# Patient Record
Sex: MALE | Race: WHITE | NOT HISPANIC OR LATINO | Employment: PART TIME | URBAN - METROPOLITAN AREA
[De-identification: names, ages, dates, MRNs, and addresses within clinical notes are randomized per-mention and may not be internally consistent; named-entity substitution may affect disease eponyms.]

---

## 2021-07-15 ENCOUNTER — HISTORICAL (OUTPATIENT)
Dept: RADIOLOGY | Facility: HOSPITAL | Age: 56
End: 2021-07-15

## 2022-04-11 ENCOUNTER — HISTORICAL (OUTPATIENT)
Dept: ADMINISTRATIVE | Facility: HOSPITAL | Age: 57
End: 2022-04-11

## 2022-04-25 VITALS
SYSTOLIC BLOOD PRESSURE: 127 MMHG | HEIGHT: 67 IN | OXYGEN SATURATION: 96 % | DIASTOLIC BLOOD PRESSURE: 71 MMHG | BODY MASS INDEX: 34.81 KG/M2 | WEIGHT: 221.81 LBS

## 2022-08-02 ENCOUNTER — OFFICE VISIT (OUTPATIENT)
Dept: CARDIOLOGY | Facility: CLINIC | Age: 57
End: 2022-08-02

## 2022-08-02 VITALS
DIASTOLIC BLOOD PRESSURE: 79 MMHG | WEIGHT: 217.81 LBS | RESPIRATION RATE: 18 BRPM | TEMPERATURE: 98 F | BODY MASS INDEX: 34.19 KG/M2 | OXYGEN SATURATION: 99 % | HEART RATE: 84 BPM | SYSTOLIC BLOOD PRESSURE: 123 MMHG | HEIGHT: 67 IN

## 2022-08-02 DIAGNOSIS — I25.10 CORONARY ARTERY DISEASE INVOLVING NATIVE CORONARY ARTERY OF NATIVE HEART WITHOUT ANGINA PECTORIS: Primary | ICD-10-CM

## 2022-08-02 DIAGNOSIS — E78.2 MIXED HYPERLIPIDEMIA: ICD-10-CM

## 2022-08-02 DIAGNOSIS — Z72.0 TOBACCO USE: ICD-10-CM

## 2022-08-02 DIAGNOSIS — I73.9 CLAUDICATION: ICD-10-CM

## 2022-08-02 DIAGNOSIS — R07.89 OTHER CHEST PAIN: ICD-10-CM

## 2022-08-02 DIAGNOSIS — I10 HTN (HYPERTENSION), BENIGN: ICD-10-CM

## 2022-08-02 PROCEDURE — 99214 OFFICE O/P EST MOD 30 MIN: CPT | Mod: PBBFAC | Performed by: NURSE PRACTITIONER

## 2022-08-02 PROCEDURE — 93005 ELECTROCARDIOGRAM TRACING: CPT

## 2022-08-02 PROCEDURE — 99214 OFFICE O/P EST MOD 30 MIN: CPT | Mod: S$PBB,,, | Performed by: NURSE PRACTITIONER

## 2022-08-02 PROCEDURE — 99214 PR OFFICE/OUTPT VISIT, EST, LEVL IV, 30-39 MIN: ICD-10-PCS | Mod: S$PBB,,, | Performed by: NURSE PRACTITIONER

## 2022-08-02 RX ORDER — METOPROLOL TARTRATE 25 MG/1
25 TABLET, FILM COATED ORAL DAILY
COMMUNITY
Start: 2022-04-14 | End: 2022-08-02 | Stop reason: SDUPTHER

## 2022-08-02 RX ORDER — CLOPIDOGREL BISULFATE 75 MG/1
75 TABLET ORAL DAILY
COMMUNITY
Start: 2022-07-11 | End: 2022-08-02 | Stop reason: SDUPTHER

## 2022-08-02 RX ORDER — ATORVASTATIN CALCIUM 80 MG/1
80 TABLET, FILM COATED ORAL NIGHTLY
COMMUNITY
Start: 2022-04-14 | End: 2022-08-02 | Stop reason: SDUPTHER

## 2022-08-02 RX ORDER — BUPROPION HYDROCHLORIDE 100 MG/1
100 TABLET, EXTENDED RELEASE ORAL 2 TIMES DAILY
Status: ON HOLD | COMMUNITY
Start: 2022-04-14 | End: 2023-02-02

## 2022-08-02 RX ORDER — METOPROLOL TARTRATE 25 MG/1
25 TABLET, FILM COATED ORAL DAILY
Qty: 90 TABLET | Refills: 3 | Status: SHIPPED | OUTPATIENT
Start: 2022-08-02 | End: 2023-02-09 | Stop reason: SDUPTHER

## 2022-08-02 RX ORDER — ATORVASTATIN CALCIUM 80 MG/1
80 TABLET, FILM COATED ORAL NIGHTLY
Qty: 90 TABLET | Refills: 3 | Status: SHIPPED | OUTPATIENT
Start: 2022-08-02 | End: 2023-02-09 | Stop reason: SDUPTHER

## 2022-08-02 RX ORDER — IBUPROFEN 200 MG
1 TABLET ORAL DAILY
COMMUNITY
Start: 2022-03-18

## 2022-08-02 RX ORDER — LISINOPRIL 20 MG/1
20 TABLET ORAL DAILY
Qty: 90 TABLET | Refills: 3 | Status: SHIPPED | OUTPATIENT
Start: 2022-08-02 | End: 2023-02-09 | Stop reason: SDUPTHER

## 2022-08-02 RX ORDER — LISINOPRIL 20 MG/1
20 TABLET ORAL DAILY
COMMUNITY
Start: 2022-07-11 | End: 2022-08-02 | Stop reason: SDUPTHER

## 2022-08-02 RX ORDER — CLOPIDOGREL BISULFATE 75 MG/1
75 TABLET ORAL DAILY
Qty: 90 TABLET | Refills: 3 | Status: SHIPPED | OUTPATIENT
Start: 2022-08-02 | End: 2023-01-12

## 2022-08-02 NOTE — PROGRESS NOTES
CHIEF COMPLAINT:   Chief Complaint   Patient presents with    13mt f/u.     Pt was air lifted to [facility] in April with chest pain and was told he did not have an MI.                    Review of patient's allergies indicates:  No Known Allergies                                       HPI:  Paco Culp 56 y.o. male  with CAD/STEMI with stenting PCI/RCA in November 2019, PTCA/RCA (Nov. 2019), PTCA/stenting Diagnal 1 (Sept. 2020) . He is a former patient of [facility], .  Patient was last seen in Cardiology clinic in June 2021 to establish care.  During his last clinic visit,  the patient endorsed midsternal chest pain that he described as a squeezing pain that radiated to bilateral arms and neck.  He was scheduled to undergo a nuclear stress test but was able to complete it.  Today the patient continues to endorse intermittent chest pain that it describes as an exertional midsternal squeezing pain with radiation to arms bilaterally.  Of note,  the patient reports that he was airlifted to [facility] in April with chest pain symptoms but was found to not have an acute MI.  Chest x-ray obtained that time revealed acute bronchitis.  He also continues to endorse ongoing exertional dyspnea that has not progressed since seen.  He denies any palpitations, orthopnea, PND or syncope.  He does continue to endorse intermittent claudication symptoms, citing pain in bilateral legs when ambulating.  Of note, the patient was to complete previously ordered PRASHANTH testing but was unable to complete testing due to loss of insurance.  He reports compliance with his medications.      TTE-09/08/2020  The study quality is average  The left ventricle is normal in size.  Global left ventricular systolic function is borderline normal.  The left ventricular ejection fraction is 50%.    The left ventricle diastolic function is impaired grade 1 with normal left atrial pressure  The left atrial diameter is mildly increased  Mild 1+ mitral  regurgitation  Insufficient TR to estimate RVSP      Left heart catheterization 11/03/2019  Procedure summary  Acute inferior MI  PTCA/stent under proximal RCA 0%    Left heart catheterization 09/23/2020  PTA/stent Diagonal 1( ISR) 80- 90% to 10%.                                                                                                                                                                                                                                                                                                                                                                                                                                                                  Patient Active Problem List   Diagnosis    Coronary artery disease involving native coronary artery of native heart without angina pectoris    HTN (hypertension), benign    Mixed hyperlipidemia    Tobacco use     Past Surgical History:   Procedure Laterality Date    coronary stents        Social History     Socioeconomic History    Marital status: Unknown   Tobacco Use    Smoking status: Current Every Day Smoker     Packs/day: 1.00     Years: 30.00     Pack years: 30.00    Smokeless tobacco: Never Used   Substance and Sexual Activity    Alcohol use: Not Currently    Drug use: Never        Family History   Problem Relation Age of Onset    Heart disease Mother     Hyperlipidemia Mother     Hypertension Mother     Cancer Father          Current Outpatient Medications:     atorvastatin (LIPITOR) 80 MG tablet, Take 80 mg by mouth every evening., Disp: , Rfl:     buPROPion (WELLBUTRIN SR) 100 MG TBSR 12 hr tablet, Take 100 mg by mouth 2 (two) times a day., Disp: , Rfl:     clopidogreL (PLAVIX) 75 mg tablet, Take 75 mg by mouth once daily., Disp: , Rfl:     lisinopriL (PRINIVIL,ZESTRIL) 20 MG tablet, Take 20 mg by mouth once daily., Disp: , Rfl:     metoprolol tartrate (LOPRESSOR) 25 MG tablet, Take 25 mg by mouth once daily., Disp: , Rfl:  "    nicotine (NICODERM CQ) 21 mg/24 hr, Place 1 patch onto the skin once daily., Disp: , Rfl:      ROS:                                                                                                                                                                             Review of Systems   Constitutional: Negative.    HENT: Negative.    Eyes: Negative.    Respiratory: Positive for cough and shortness of breath.    Cardiovascular: Positive for chest pain and claudication.   Gastrointestinal: Negative.    Genitourinary: Negative.    Musculoskeletal: Negative.    Skin: Negative.    Neurological: Negative.    Endo/Heme/Allergies: Negative.    Psychiatric/Behavioral: Negative.         Blood pressure 123/79, pulse 84, temperature 98.2 °F (36.8 °C), resp. rate 18, height 5' 6.93" (1.7 m), weight 98.8 kg (217 lb 13 oz), SpO2 99 %.   PE:  Physical Exam  Constitutional:       Appearance: Normal appearance.   HENT:      Head: Normocephalic.   Eyes:      Pupils: Pupils are equal, round, and reactive to light.   Cardiovascular:      Rate and Rhythm: Normal rate and regular rhythm.      Pulses: Normal pulses.   Pulmonary:      Effort: Pulmonary effort is normal.   Musculoskeletal:         General: Normal range of motion.   Skin:     General: Skin is warm and dry.   Neurological:      General: No focal deficit present.      Mental Status: He is alert and oriented to person, place, and time.   Psychiatric:         Mood and Affect: Mood normal.         Behavior: Behavior normal.                ASSESSMENT/PLAN:  CAD  - Reports intermittent mid sternal chest pain with radiation to bilateral arms. Reports stable STERN  - Recent hospital evaluation with atypical chest pain symptoms, acute MI ruled out  - CAD/STEMI with stenting PCI/RCA in November 2019  - PTCA/RCA (Nov. 2019), PTCA/stenting Diagnal 1 (Sept. 2020)   - Continue Plavix, aspirin, lisinopril, metoprolol tartrate and sublingual nitroglycerin.   - Will order a nuclear " stress test for further evaluation of symptoms.  Has multiple risk factors of HTN, HLD, obesity and tobacco. Reports unable to walk on treadmill due to claudication symptoms  - EKG today       STERN  - Reports ongoing stable exertional dyspnea  - LVEF- 50% per TTE 9.28.20    Hypertension-at goal  - Continue current regimen   - Counseled on low sodium diet    Hyperlipidemia, no recent labs   - Continue atorvastatin 80 mg p.o. daily  - Counseled on low-cholesterol low-fat diet  andencouraged exercise as tolerated  - CMP/FLP within two weeks     Pain in legs  - Reports pain in bilateral legs with ambulation, relieved with rest  - PRASHANTH of BLE for further evaluation     Tobacco use  - Reports smokes a pack of cigarettes a day and is attempting to wean  - Counseled on importance of smoking cessation     EKG  CMP/FLP within two weeks    PRASHANTH of BLE  Nuclear stress test  Referral to Internal Medicine for establishment of PCP  Follow up in cardiology clinic in 2 months or sooner pending results.

## 2022-08-02 NOTE — PATIENT INSTRUCTIONS
EKG  CMP/FLP within two weeks    PRASHANTH of BLE  Nuclear stress test  Referral to Internal Medicine for establishment of PCP  Follow up in cardiology clinic in 2 months or sooner pending results.

## 2022-08-19 ENCOUNTER — HOSPITAL ENCOUNTER (OUTPATIENT)
Dept: RADIOLOGY | Facility: HOSPITAL | Age: 57
Discharge: HOME | End: 2022-08-19
Attending: NURSE PRACTITIONER

## 2022-08-19 ENCOUNTER — HOSPITAL ENCOUNTER (OUTPATIENT)
Dept: CARDIOLOGY | Facility: HOSPITAL | Age: 57
Discharge: HOME | End: 2022-08-19
Attending: NURSE PRACTITIONER

## 2022-08-19 VITALS — SYSTOLIC BLOOD PRESSURE: 124 MMHG | HEART RATE: 83 BPM | DIASTOLIC BLOOD PRESSURE: 89 MMHG | RESPIRATION RATE: 20 BRPM

## 2022-08-19 DIAGNOSIS — I25.10 CORONARY ARTERY DISEASE INVOLVING NATIVE CORONARY ARTERY OF NATIVE HEART WITHOUT ANGINA PECTORIS: ICD-10-CM

## 2022-08-19 DIAGNOSIS — Z72.0 TOBACCO USE: ICD-10-CM

## 2022-08-19 DIAGNOSIS — E78.2 MIXED HYPERLIPIDEMIA: ICD-10-CM

## 2022-08-19 DIAGNOSIS — I10 HTN (HYPERTENSION), BENIGN: ICD-10-CM

## 2022-08-19 DIAGNOSIS — R07.89 OTHER CHEST PAIN: ICD-10-CM

## 2022-08-19 LAB
CV STRESS BASE HR: 75 BPM
DIASTOLIC BLOOD PRESSURE: 89 MMHG
NUC REST EJECTION FRACTION: 45
NUC STRESS EJECTION FRACTION: 48 %
OHS CV CPX 85 PERCENT MAX PREDICTED HEART RATE MALE: 141
OHS CV CPX ESTIMATED METS: 2
OHS CV CPX MAX PREDICTED HEART RATE: 166
OHS CV CPX PATIENT IS FEMALE: 0
OHS CV CPX PATIENT IS MALE: 1
OHS CV CPX PEAK DIASTOLIC BLOOD PRESSURE: 80 MMHG
OHS CV CPX PEAK HEAR RATE: 104 BPM
OHS CV CPX PEAK RATE PRESSURE PRODUCT: NORMAL
OHS CV CPX PEAK SYSTOLIC BLOOD PRESSURE: 133 MMHG
OHS CV CPX PERCENT MAX PREDICTED HEART RATE ACHIEVED: 63
OHS CV CPX RATE PRESSURE PRODUCT PRESENTING: 9300
STRESS ECHO POST EXERCISE DUR MIN: 3 MINUTES
STRESS ECHO POST EXERCISE DUR SEC: 0 SECONDS
SYSTOLIC BLOOD PRESSURE: 124 MMHG

## 2022-08-19 PROCEDURE — 63600175 PHARM REV CODE 636 W HCPCS: Performed by: NURSE PRACTITIONER

## 2022-08-19 PROCEDURE — A9502 TC99M TETROFOSMIN: HCPCS

## 2022-08-19 RX ORDER — REGADENOSON 0.08 MG/ML
0.4 INJECTION, SOLUTION INTRAVENOUS
Status: COMPLETED | OUTPATIENT
Start: 2022-08-19 | End: 2022-08-19

## 2022-08-19 RX ADMIN — REGADENOSON 0.4 MG: 0.08 INJECTION, SOLUTION INTRAVENOUS at 07:08

## 2022-09-27 ENCOUNTER — HOSPITAL ENCOUNTER (OUTPATIENT)
Dept: RADIOLOGY | Facility: HOSPITAL | Age: 57
Discharge: HOME | End: 2022-09-27
Attending: NURSE PRACTITIONER

## 2022-09-27 DIAGNOSIS — I73.9 CLAUDICATION: ICD-10-CM

## 2022-09-27 LAB
LEFT ABI: 1.18
LEFT ARM BP: 131 MMHG
LEFT DORSALIS PEDIS: 157 MMHG
LEFT POSTERIOR TIBIAL: 154 MMHG
LEFT TBI: 0.53
LEFT TOE PRESSURE: 71 MMHG
RIGHT ABI: 1.09
RIGHT ARM BP: 133 MMHG
RIGHT DORSALIS PEDIS: 125 MMHG
RIGHT POSTERIOR TIBIAL: 145 MMHG
RIGHT TBI: 0.66
RIGHT TOE PRESSURE: 88 MMHG

## 2022-09-27 PROCEDURE — 93922 UPR/L XTREMITY ART 2 LEVELS: CPT

## 2022-10-12 ENCOUNTER — OFFICE VISIT (OUTPATIENT)
Dept: CARDIOLOGY | Facility: CLINIC | Age: 57
End: 2022-10-12

## 2022-10-12 VITALS
HEIGHT: 67 IN | HEART RATE: 77 BPM | RESPIRATION RATE: 16 BRPM | WEIGHT: 220.81 LBS | SYSTOLIC BLOOD PRESSURE: 124 MMHG | OXYGEN SATURATION: 94 % | DIASTOLIC BLOOD PRESSURE: 88 MMHG | BODY MASS INDEX: 34.66 KG/M2 | TEMPERATURE: 98 F

## 2022-10-12 DIAGNOSIS — E78.2 MIXED HYPERLIPIDEMIA: ICD-10-CM

## 2022-10-12 DIAGNOSIS — R06.09 DOE (DYSPNEA ON EXERTION): ICD-10-CM

## 2022-10-12 DIAGNOSIS — I25.10 CORONARY ARTERY DISEASE INVOLVING NATIVE CORONARY ARTERY OF NATIVE HEART WITHOUT ANGINA PECTORIS: Primary | ICD-10-CM

## 2022-10-12 DIAGNOSIS — R55 NEAR SYNCOPE: ICD-10-CM

## 2022-10-12 DIAGNOSIS — R07.89 OTHER CHEST PAIN: ICD-10-CM

## 2022-10-12 DIAGNOSIS — Z72.0 TOBACCO USE: ICD-10-CM

## 2022-10-12 DIAGNOSIS — I10 HTN (HYPERTENSION), BENIGN: ICD-10-CM

## 2022-10-12 PROCEDURE — 4010F ACE/ARB THERAPY RXD/TAKEN: CPT | Mod: ,,, | Performed by: NURSE PRACTITIONER

## 2022-10-12 PROCEDURE — 4010F PR ACE/ARB THEARPY RXD/TAKEN: ICD-10-PCS | Mod: ,,, | Performed by: NURSE PRACTITIONER

## 2022-10-12 PROCEDURE — 99214 OFFICE O/P EST MOD 30 MIN: CPT | Mod: S$PBB,,, | Performed by: NURSE PRACTITIONER

## 2022-10-12 PROCEDURE — 99214 PR OFFICE/OUTPT VISIT, EST, LEVL IV, 30-39 MIN: ICD-10-PCS | Mod: S$PBB,,, | Performed by: NURSE PRACTITIONER

## 2022-10-12 PROCEDURE — 99214 OFFICE O/P EST MOD 30 MIN: CPT | Mod: PBBFAC | Performed by: NURSE PRACTITIONER

## 2022-10-12 RX ORDER — ISOSORBIDE MONONITRATE 30 MG/1
30 TABLET, EXTENDED RELEASE ORAL DAILY
Qty: 30 TABLET | Refills: 6 | Status: SHIPPED | OUTPATIENT
Start: 2022-10-12 | End: 2023-01-12 | Stop reason: SDUPTHER

## 2022-10-12 NOTE — PATIENT INSTRUCTIONS
Add Imdur 30 mg qday   Nurse visit in 2-3 weeks for BP and symptoms   Echocardiogram   Carotid US   Referral to Internal Medicine for establishment of PCP- will check on status   Follow up in cardiology clinic in 3 months or sooner pending results.

## 2022-10-12 NOTE — PROGRESS NOTES
"        CHIEF COMPLAINT:   Chief Complaint   Patient presents with    Follow-up     Patient her for 2 mth f/u w stress test results and ABIs. Pt c/o cp "a few days ago" Pt c/o sob while walking. Pt c/o palpitations "sometimes" Pt c/o pain to angie lower legs. Pt denies dizziness and swelling. Questions.    Shortness of Breath    Chest Pain    Palpitations                   Review of patient's allergies indicates:  No Known Allergies                                       HPI:  Paco Culp 57 y.o. male  with CAD/STEMI with stenting PCI/RCA in November 2019, PTCA/RCA (Nov. 2019), PTCA/stenting Diagnal 1 (Sept. 2020) who presents for routine follow up and results of testing. He is a former patient of [facility], .  During his last clinic visit,  the patient endorsed midsternal chest pain that he described as a squeezing pain that radiated to bilateral arms and neck.  He completed a subsequent nuclear stress test that was abnormal revealing a moderate to large-sized, fixed perfusion abnormality consistent with scar in the basal to distal inferoapical walls in the typical distribution of the RCA territory.( 8.19.22)      Today the patient endorses an episode of midsternal chest pain with radiation to bilateral arms.  However, he denies any use of sublingual nitroglycerin.  He also continues to endorse ongoing exertional dyspnea that he states has slightly progressed since last seen.  However, he states his ADLs are unchanged.  He continues to endorse bilateral leg pain.  Of note, the patient completed PRASHANTH testing on 09/27/2022 with no evidence of significant arterial insufficiency.  He denies any orthopnea, palpitations, or PND.  He does endorse occasional episodes of dizziness with near-syncope but denies any actual syncopal episodes.  He reports compliance with his current medications.     Nuclear Stress - Cardiology Interpreted 8.19.22    Interpretation Summary    Abnormal myocardial perfusion scan.    There is a " moderate to large sized, fixed perfusion abnormality consistent with scar in the basal to distal inferoapical wall(s) in the typical distribution of the RCA territory.    There are no other significant perfusion abnormalities.    The gated perfusion images showed an ejection fraction of 45% at rest. The gated perfusion images showed an ejection fraction of 48% post stress.    The EKG portion of this study is negative for ischemia.    The patient reported no chest pain during the stress test.    There were no arrhythmias during stress.    Scar present     TTE-09/08/2020  The study quality is average  The left ventricle is normal in size.  Global left ventricular systolic function is borderline normal.  The left ventricular ejection fraction is 50%.    The left ventricle diastolic function is impaired grade 1 with normal left atrial pressure  The left atrial diameter is mildly increased  Mild 1+ mitral regurgitation  Insufficient TR to estimate RVSP        Left heart catheterization 11/03/2019  Procedure summary  Acute inferior MI  PTCA/stent under proximal RCA 0%     Left heart catheterization 09/23/2020  PTA/stent Diagonal 1( ISR) 80- 90% to 10%.                                                                                                                                                                                            Patient Active Problem List   Diagnosis    Coronary artery disease involving native coronary artery of native heart without angina pectoris    HTN (hypertension), benign    Mixed hyperlipidemia    Tobacco use     Past Surgical History:   Procedure Laterality Date    coronary stents        Social History     Socioeconomic History    Marital status:    Tobacco Use    Smoking status: Every Day     Packs/day: 1.00     Years: 30.00     Pack years: 30.00     Types: Cigarettes    Smokeless tobacco: Never   Substance and Sexual Activity    Alcohol use: Not Currently    Drug use: Never       "  Family History   Problem Relation Age of Onset    Heart disease Mother     Hyperlipidemia Mother     Hypertension Mother     Cancer Father          Current Outpatient Medications:     atorvastatin (LIPITOR) 80 MG tablet, Take 1 tablet (80 mg total) by mouth every evening., Disp: 90 tablet, Rfl: 3    buPROPion (WELLBUTRIN SR) 100 MG TBSR 12 hr tablet, Take 100 mg by mouth 2 (two) times a day., Disp: , Rfl:     clopidogreL (PLAVIX) 75 mg tablet, Take 1 tablet (75 mg total) by mouth once daily., Disp: 90 tablet, Rfl: 3    lisinopriL (PRINIVIL,ZESTRIL) 20 MG tablet, Take 1 tablet (20 mg total) by mouth once daily., Disp: 90 tablet, Rfl: 3    metoprolol tartrate (LOPRESSOR) 25 MG tablet, Take 1 tablet (25 mg total) by mouth once daily., Disp: 90 tablet, Rfl: 3    nicotine (NICODERM CQ) 21 mg/24 hr, Place 1 patch onto the skin once daily., Disp: , Rfl:     isosorbide mononitrate (IMDUR) 30 MG 24 hr tablet, Take 1 tablet (30 mg total) by mouth once daily., Disp: 30 tablet, Rfl: 6     ROS:                                                                                                                                                                             Review of Systems   Constitutional: Negative.    HENT: Negative.     Eyes: Negative.    Respiratory:  Positive for shortness of breath.    Cardiovascular:  Positive for chest pain.   Gastrointestinal: Negative.    Genitourinary: Negative.    Musculoskeletal: Negative.    Skin: Negative.    Neurological: Negative.    Endo/Heme/Allergies: Negative.    Psychiatric/Behavioral: Negative.        Blood pressure 124/88, pulse 77, temperature 97.8 °F (36.6 °C), temperature source Oral, resp. rate 16, height 5' 7" (1.702 m), weight 100.2 kg (220 lb 12.8 oz), SpO2 (!) 94 %.   PE:  Physical Exam  Constitutional:       Appearance: Normal appearance.   HENT:      Head: Normocephalic.   Eyes:      Pupils: Pupils are equal, round, and reactive to light.   Cardiovascular:      Rate " and Rhythm: Normal rate and regular rhythm.      Pulses: Normal pulses.   Pulmonary:      Effort: Pulmonary effort is normal.   Musculoskeletal:         General: Normal range of motion.   Skin:     General: Skin is warm and dry.   Neurological:      General: No focal deficit present.      Mental Status: He is alert and oriented to person, place, and time.   Psychiatric:         Mood and Affect: Mood normal.         Behavior: Behavior normal.              ASSESSMENT/PLAN:  CAD  - Reports intermittent mid sternal chest pain with radiation to bilateral arms. Denied SL nitro use  - Abnormal nuclear stress test-  moderate to large sized, fixed perfusion abnormality consistent with scar in the basal to distal inferoapical wall(s) in the typical distribution of the RCA territory. No reversible ischemia noted (8.19.22)  - CAD/STEMI with stenting PCI/RCA in November 2019  - PTCA/RCA (Nov. 2019), PTCA/stenting Diagnal 1 (Sept. 2020)   - Continue Plavix, aspirin, lisinopril, metoprolol tartrate and sublingual nitroglycerin. Will add Imdur 30 mg qd for continued anginal symptoms  - Nurse visit in 2-3 weeks for BP and symptom check   - Counseled patient on heart healthy, low-cholesterol diet, smoking cessation and activity as tolerated.       STERN  - Reports slight progression and exertional dyspnea  - LVEF- 50% per TTE 9.28.20  - Consider PFTs   - Will repeat Echocardiogram to reassess LV function      Hypertension- Repeat BP at goal  - Continue current regimen. Adding Imdur   - Counseled on low sodium diet     Hyperlipidemia, no recent labs   - Continue atorvastatin 80 mg p.o. daily  - Counseled on low-cholesterol low-fat diet  andencouraged exercise as tolerated  - Patient to complete labs      Pain in legs  - Reports pain in bilateral legs with ambulation, relieved with rest  - PRASHANTH of BLE - no evidence of significant arterial insufficiency (9.27.22)    Dizziness/near syncope  - reports intermittent dizziness and near  syncope  - Carotid US to rule out stenosis, considering risk factors       Tobacco use  - Reports smokes a pack of cigarettes a day and is attempting to wean  - Counseled on importance of smoking cessation. Declined smoking cessation prgram      Add Imdur 30 mg qday   Nurse visit in 2-3 weeks for BP and symptoms   Echocardiogram   Carotid US   Referral to Internal Medicine for establishment of PCP- will check on status   Follow up in cardiology clinic in 3 months or sooner pending results.

## 2022-11-04 ENCOUNTER — HOSPITAL ENCOUNTER (OUTPATIENT)
Dept: RADIOLOGY | Facility: HOSPITAL | Age: 57
Discharge: HOME | End: 2022-11-04
Attending: NURSE PRACTITIONER

## 2022-11-04 DIAGNOSIS — R55 NEAR SYNCOPE: ICD-10-CM

## 2022-11-04 PROCEDURE — 93880 EXTRACRANIAL BILAT STUDY: CPT

## 2022-11-08 LAB
LEFT CBA DIAS: 23 CM/S
LEFT CBA SYS: 69 CM/S
LEFT CCA DIST DIAS: 24 CM/S
LEFT CCA DIST SYS: 75 CM/S
LEFT CCA MID DIAS: 21 CM/S
LEFT CCA MID SYS: 95 CM/S
LEFT CCA PROX DIAS: 24 CM/S
LEFT CCA PROX SYS: 95 CM/S
LEFT ECA DIAS: 18 CM/S
LEFT ECA SYS: 81 CM/S
LEFT ICA DIST DIAS: 17 CM/S
LEFT ICA DIST SYS: 54 CM/S
LEFT ICA MID DIAS: 20 CM/S
LEFT ICA MID SYS: 59 CM/S
LEFT ICA PROX DIAS: 16 CM/S
LEFT ICA PROX SYS: 40 CM/S
LEFT VERTEBRAL DIAS: 13 CM/S
LEFT VERTEBRAL SYS: 33 CM/S
OHS CV CAROTID RIGHT ICA EDV HIGHEST: 25
OHS CV CAROTID ULTRASOUND LEFT ICA/CCA RATIO: 0.79
OHS CV CAROTID ULTRASOUND RIGHT ICA/CCA RATIO: 0.95
OHS CV PV CAROTID LEFT HIGHEST CCA: 95
OHS CV PV CAROTID LEFT HIGHEST ICA: 59
OHS CV PV CAROTID RIGHT HIGHEST CCA: 64
OHS CV PV CAROTID RIGHT HIGHEST ICA: 61
OHS CV US CAROTID LEFT HIGHEST EDV: 20
RIGHT CBA DIAS: 16 CM/S
RIGHT CBA SYS: 55 CM/S
RIGHT CCA DIST DIAS: 14 CM/S
RIGHT CCA DIST SYS: 64 CM/S
RIGHT CCA MID DIAS: 14 CM/S
RIGHT CCA MID SYS: 54 CM/S
RIGHT CCA PROX DIAS: 14 CM/S
RIGHT CCA PROX SYS: 51 CM/S
RIGHT ECA DIAS: 27 CM/S
RIGHT ECA SYS: 94 CM/S
RIGHT ICA DIST DIAS: 22 CM/S
RIGHT ICA DIST SYS: 55 CM/S
RIGHT ICA MID DIAS: 25 CM/S
RIGHT ICA MID SYS: 60 CM/S
RIGHT ICA PROX DIAS: 25 CM/S
RIGHT ICA PROX SYS: 61 CM/S
RIGHT VERTEBRAL DIAS: 19 CM/S
RIGHT VERTEBRAL SYS: 47 CM/S

## 2023-01-12 ENCOUNTER — OFFICE VISIT (OUTPATIENT)
Dept: URGENT CARE | Facility: CLINIC | Age: 58
End: 2023-01-12

## 2023-01-12 ENCOUNTER — HOSPITAL ENCOUNTER (OUTPATIENT)
Dept: RADIOLOGY | Facility: HOSPITAL | Age: 58
Discharge: HOME | End: 2023-01-12
Attending: NURSE PRACTITIONER

## 2023-01-12 ENCOUNTER — OFFICE VISIT (OUTPATIENT)
Dept: CARDIOLOGY | Facility: CLINIC | Age: 58
End: 2023-01-12

## 2023-01-12 VITALS
HEIGHT: 67 IN | TEMPERATURE: 98 F | OXYGEN SATURATION: 94 % | HEART RATE: 88 BPM | RESPIRATION RATE: 18 BRPM | BODY MASS INDEX: 34.06 KG/M2 | WEIGHT: 217 LBS | DIASTOLIC BLOOD PRESSURE: 79 MMHG | SYSTOLIC BLOOD PRESSURE: 124 MMHG

## 2023-01-12 VITALS
RESPIRATION RATE: 18 BRPM | WEIGHT: 217.63 LBS | HEIGHT: 66 IN | BODY MASS INDEX: 34.98 KG/M2 | DIASTOLIC BLOOD PRESSURE: 81 MMHG | HEART RATE: 84 BPM | SYSTOLIC BLOOD PRESSURE: 132 MMHG | OXYGEN SATURATION: 96 % | TEMPERATURE: 99 F

## 2023-01-12 DIAGNOSIS — I25.10 CORONARY ARTERY DISEASE INVOLVING NATIVE CORONARY ARTERY OF NATIVE HEART WITHOUT ANGINA PECTORIS: Primary | ICD-10-CM

## 2023-01-12 DIAGNOSIS — I10 HTN (HYPERTENSION), BENIGN: ICD-10-CM

## 2023-01-12 DIAGNOSIS — R10.32 LEFT LOWER QUADRANT ABDOMINAL PAIN: ICD-10-CM

## 2023-01-12 DIAGNOSIS — E78.2 MIXED HYPERLIPIDEMIA: ICD-10-CM

## 2023-01-12 DIAGNOSIS — K62.5 BRIGHT RED RECTAL BLEEDING: Primary | ICD-10-CM

## 2023-01-12 DIAGNOSIS — Z72.0 TOBACCO USE: ICD-10-CM

## 2023-01-12 DIAGNOSIS — R07.89 OTHER CHEST PAIN: ICD-10-CM

## 2023-01-12 PROBLEM — J32.9 RECURRENT SINUSITIS: Status: ACTIVE | Noted: 2023-01-12

## 2023-01-12 PROBLEM — I20.9 ANGINA PECTORIS: Status: ACTIVE | Noted: 2023-01-12

## 2023-01-12 PROBLEM — Z95.5 H/O HEART ARTERY STENT: Status: ACTIVE | Noted: 2023-01-12

## 2023-01-12 PROBLEM — E78.5 HYPERLIPIDEMIA: Status: ACTIVE | Noted: 2022-08-02

## 2023-01-12 PROBLEM — E66.9 OBESITY: Status: ACTIVE | Noted: 2023-01-12

## 2023-01-12 LAB
ALBUMIN SERPL-MCNC: 3.9 G/DL (ref 3.5–5)
ALBUMIN/GLOB SERPL: 1.2 RATIO (ref 1.1–2)
ALP SERPL-CCNC: 93 UNIT/L (ref 40–150)
ALT SERPL-CCNC: 27 UNIT/L (ref 0–55)
AST SERPL-CCNC: 17 UNIT/L (ref 5–34)
BASOPHILS # BLD AUTO: 0.04 X10(3)/MCL (ref 0–0.2)
BASOPHILS NFR BLD AUTO: 0.4 %
BILIRUBIN DIRECT+TOT PNL SERPL-MCNC: 0.4 MG/DL
BUN SERPL-MCNC: 13 MG/DL (ref 8.4–25.7)
CALCIUM SERPL-MCNC: 9.5 MG/DL (ref 8.4–10.2)
CHLORIDE SERPL-SCNC: 103 MMOL/L (ref 98–107)
CO2 SERPL-SCNC: 29 MMOL/L (ref 22–29)
CREAT SERPL-MCNC: 1.13 MG/DL (ref 0.73–1.18)
EOSINOPHIL # BLD AUTO: 0.22 X10(3)/MCL (ref 0–0.9)
EOSINOPHIL NFR BLD AUTO: 2.1 %
ERYTHROCYTE [DISTWIDTH] IN BLOOD BY AUTOMATED COUNT: 12.8 % (ref 11.5–17)
GFR SERPLBLD CREATININE-BSD FMLA CKD-EPI: >60 MLS/MIN/1.73/M2
GLOBULIN SER-MCNC: 3.3 GM/DL (ref 2.4–3.5)
GLUCOSE SERPL-MCNC: 79 MG/DL (ref 74–100)
HCT VFR BLD AUTO: 51 % (ref 42–52)
HGB BLD-MCNC: 17.1 GM/DL (ref 14–18)
IMM GRANULOCYTES # BLD AUTO: 0.07 X10(3)/MCL (ref 0–0.04)
IMM GRANULOCYTES NFR BLD AUTO: 0.7 %
LYMPHOCYTES # BLD AUTO: 3.34 X10(3)/MCL (ref 0.6–4.6)
LYMPHOCYTES NFR BLD AUTO: 32.3 %
MCH RBC QN AUTO: 30.5 PG
MCHC RBC AUTO-ENTMCNC: 33.5 MG/DL (ref 33–36)
MCV RBC AUTO: 91.1 FL (ref 80–94)
MONOCYTES # BLD AUTO: 0.72 X10(3)/MCL (ref 0.1–1.3)
MONOCYTES NFR BLD AUTO: 7 %
NEUTROPHILS # BLD AUTO: 5.94 X10(3)/MCL (ref 2.1–9.2)
NEUTROPHILS NFR BLD AUTO: 57.5 %
NRBC BLD AUTO-RTO: 0 %
PLATELET # BLD AUTO: 347 X10(3)/MCL (ref 130–400)
PMV BLD AUTO: 9.9 FL (ref 7.4–10.4)
POTASSIUM SERPL-SCNC: 4.6 MMOL/L (ref 3.5–5.1)
PROT SERPL-MCNC: 7.2 GM/DL (ref 6.4–8.3)
RBC # BLD AUTO: 5.6 X10(6)/MCL (ref 4.7–6.1)
SODIUM SERPL-SCNC: 140 MMOL/L (ref 136–145)
WBC # SPEC AUTO: 10.3 X10(3)/MCL (ref 4.5–11.5)

## 2023-01-12 PROCEDURE — 72170 X-RAY EXAM OF PELVIS: CPT | Mod: TC

## 2023-01-12 PROCEDURE — 99215 OFFICE O/P EST HI 40 MIN: CPT | Mod: PBBFAC | Performed by: NURSE PRACTITIONER

## 2023-01-12 PROCEDURE — 99214 OFFICE O/P EST MOD 30 MIN: CPT | Mod: S$PBB,,, | Performed by: NURSE PRACTITIONER

## 2023-01-12 PROCEDURE — 85025 COMPLETE CBC W/AUTO DIFF WBC: CPT | Performed by: NURSE PRACTITIONER

## 2023-01-12 PROCEDURE — 74019 RADEX ABDOMEN 2 VIEWS: CPT | Mod: 26,,, | Performed by: RADIOLOGY

## 2023-01-12 PROCEDURE — 72170 XR PELVIS ROUTINE AP: ICD-10-PCS | Mod: 26,,, | Performed by: RADIOLOGY

## 2023-01-12 PROCEDURE — 99214 PR OFFICE/OUTPT VISIT, EST, LEVL IV, 30-39 MIN: ICD-10-PCS | Mod: S$PBB,,, | Performed by: NURSE PRACTITIONER

## 2023-01-12 PROCEDURE — 36415 COLL VENOUS BLD VENIPUNCTURE: CPT | Performed by: NURSE PRACTITIONER

## 2023-01-12 PROCEDURE — 72170 X-RAY EXAM OF PELVIS: CPT | Mod: 26,,, | Performed by: RADIOLOGY

## 2023-01-12 PROCEDURE — 99215 OFFICE O/P EST HI 40 MIN: CPT | Mod: PBBFAC,27 | Performed by: NURSE PRACTITIONER

## 2023-01-12 PROCEDURE — 74019 RADEX ABDOMEN 2 VIEWS: CPT | Mod: TC

## 2023-01-12 PROCEDURE — 80053 COMPREHEN METABOLIC PANEL: CPT | Performed by: NURSE PRACTITIONER

## 2023-01-12 PROCEDURE — 74019 XR ABDOMEN FLAT AND ERECT: ICD-10-PCS | Mod: 26,,, | Performed by: RADIOLOGY

## 2023-01-12 RX ORDER — ASPIRIN 81 MG/1
81 TABLET ORAL DAILY
Qty: 90 TABLET | Refills: 3 | Status: SHIPPED | OUTPATIENT
Start: 2023-01-12 | End: 2023-02-09 | Stop reason: SDUPTHER

## 2023-01-12 RX ORDER — ALBUTEROL SULFATE 90 UG/1
2 AEROSOL, METERED RESPIRATORY (INHALATION) EVERY 4 HOURS PRN
COMMUNITY
Start: 2022-11-19 | End: 2023-02-09 | Stop reason: SDUPTHER

## 2023-01-12 RX ORDER — ISOSORBIDE MONONITRATE 30 MG/1
30 TABLET, EXTENDED RELEASE ORAL DAILY
Qty: 90 TABLET | Refills: 3 | Status: SHIPPED | OUTPATIENT
Start: 2023-01-12 | End: 2023-02-09 | Stop reason: SDUPTHER

## 2023-01-12 RX ORDER — DOCUSATE CALCIUM 240 MG
240 CAPSULE ORAL DAILY
Qty: 10 CAPSULE | Refills: 0 | Status: SHIPPED | OUTPATIENT
Start: 2023-01-12 | End: 2023-01-22

## 2023-01-12 NOTE — PATIENT INSTRUCTIONS
You have been referred to Internal Medicine Clinic to establish a PCP.  You have been referred to Gastroenterology Clinic for a colonoscopy.    We did lab work and and xray for you today. If there is anything abnormal or anything you need to know, we will call you today or tomorrow.  Results will post to your PORTAL HUE - [facility] over the next 1-5 days. Please check  your hue frequently for results and messages.   Nurse calls from our clinic can take 1-2 weeks even with abnormal results.   If something is urgent, we will call you today.      If you have a large amount of bleeding, you feel weak/short of breath, heart flutters, you feel you may pass out, you start having fevers often - go to the ER.

## 2023-01-12 NOTE — PATIENT INSTRUCTIONS
Complete labs    Discontinue Plavix   Continue ASA daily   Referral to Internal Medicine for establishment of PCP  Follow up in cardiology clinic in 6 months or sooner if needed

## 2023-01-12 NOTE — PROGRESS NOTES
"Subjective:       Patient ID: Paco Culp is a 57 y.o. male.    Vitals:  height is 5' 6" (1.676 m) and weight is 98.7 kg (217 lb 9.6 oz). His oral temperature is 98.6 °F (37 °C). His blood pressure is 132/81 and his pulse is 84. His respiration is 18 and oxygen saturation is 96%.     Chief Complaint: Rectal Bleeding (Patient [place] [facility] [place]/o rectal bleeding and requesting referral to GI. )    HPI hx coronary stents x4 with plavix therapy as recently as 2 weeks ago. Saw cardiology this morning, they dc Plavix. Still on daily ASA81. No PCP due to no insurance. Has been referred to IM Clinic at [facility] but has been unable to obtain an appointment. Sees a walk in clinic in [place]. 1.5 PPD smoker x 30 years. Both parents  of cancers, pt doesn't know what kind. No personal history of cancer. No fevers. 7# weight loss recently. No night sweats or fatigue. Denies hemorrhoids, states has had hemorrhoidectomy in the past.  ROS    Objective:      Physical Exam   Constitutional: He is oriented to person, place, and time.  Non-toxic appearance. He does not appear ill. No distress. obesity  HENT:   Nose: No rhinorrhea or congestion.   Eyes: Conjunctivae are normal.   Pulmonary/Chest: Effort normal and breath sounds normal.   Abdominal: Normal appearance and bowel sounds are normal. He exhibits no mass. protuberant There is abdominal tenderness in the periumbilical area and left lower quadrant. There is no rebound, no guarding and negative Rovsing's sign. No hernia.   Musculoskeletal: Normal range of motion.         General: Normal range of motion.   Neurological: He is alert and oriented to person, place, and time.   Skin: Skin is warm, dry and not diaphoretic.   Psychiatric: His behavior is normal. Mood, judgment and thought content normal.   Vitals reviewed.      Assessment:       1. Bright red rectal bleeding    2. Left lower quadrant abdominal pain        XR ABDOMEN FLAT AND ERECT    Result Date: " 1/12/2023  EXAMINATION: XR ABDOMEN FLAT AND ERECT   CLINICAL HISTORY: BRBPR and abd pain; 7# weight loss;Hemorrhage of anus and rectum   TECHNIQUE: Two views   COMPARISON: None available   FINDINGS: There is moderate colonic fecal loading.  The intestinal gas pattern is nonspecific and nonobstructive. No air fluid levels or pneumoperitoneum identified.     Nonspecific bowel gas pattern.   Electronically signed by: Kaden Silva   Date:    01/12/2023   Time:    12:21    XR PELVIS ROUTINE AP    Result Date: 1/12/2023  EXAMINATION: XR PELVIS ROUTINE AP   CLINICAL HISTORY: BRBPR and abd pain; 7# weight loss;Hemorrhage of anus and rectum   TECHNIQUE: One view   FINDINGS: Bowel gas pattern is nonspecific nonobstructive.  Articular surfaces alignment is preserved and there is no intrinsic osseous abnormality.  No acute fracture, dislocation or significant arthritic changes.  There are 2 radiopacities inferior to the right femoral head which probably are external to the patient.     No acute diagnostic findings on the plain radiograph identified.   Electronically signed by: Kaden Silva   Date:    01/12/2023   Time:    12:23     Plan:         Bright red rectal bleeding  -     XR ABDOMEN FLAT AND ERECT  -     XR PELVIS ROUTINE AP  -     Ambulatory referral/consult to Internal Medicine  -     Ambulatory referral/consult to Gastroenterology  -     CBC Auto Differential  -     Comprehensive Metabolic Panel  -     Protime-INR  -     APTT    Left lower quadrant abdominal pain  -     XR ABDOMEN FLAT AND ERECT  -     XR PELVIS ROUTINE AP  -     Ambulatory referral/consult to Internal Medicine  -     Ambulatory referral/consult to Gastroenterology  -     CBC Auto Differential  -     Comprehensive Metabolic Panel  -     Protime-INR  -     APTT    Other orders  -     docusate calcium (SURFAK) 240 mg capsule; Take 1 capsule (240 mg total) by mouth once daily. To soften stool. for 10 days  Dispense: 10 capsule; Refill: 0                You have been referred to Internal Medicine Clinic to establish a PCP.  You have been referred to Gastroenterology Clinic for a colonoscopy.    We did lab work and and xray for you today. If there is anything abnormal or anything you need to know, we will call you today or tomorrow.  Results will post to your PORTAL HUE - [facility] over the next 1-5 days. Please check  your hue frequently for results and messages.   Nurse calls from our clinic can take 1-2 weeks even with abnormal results.   If something is urgent, we will call you today.      If you have a large amount of bleeding, you feel weak/short of breath, heart flutters, you feel you may pass out, you start having fevers often - go to the ER.

## 2023-01-12 NOTE — PROGRESS NOTES
CHIEF COMPLAINT:   Chief Complaint   Patient presents with    3mt f/u with carotid.     Pt did not do echo due to transportation costs.  He would like for you to review his PRASHANTH with him again, he doesn't recall results from last visit.  He has had NO meds x 2 weeks due to costs.                    Review of patient's allergies indicates:  No Known Allergies                                       HPI:  Paco Culp 57 y.o. male  with CAD/STEMI with stenting PCI/RCA in November 2019, PTCA/stenting Diagnal 1 (Sept. 2020) who presents for routine follow up and results of testing. He is a former patient of [facility], .  The patient completed a nuclear stress test on 8.19.22 due to symptoms of chest pain that was abnormal revealing a moderate to large-sized, fixed perfusion abnormality consistent with scar in the basal to distal inferoapical walls in the typical distribution of the RCA territory.( 8.19.22)  He completed PRASHANTH testing on 9.27.22 that revealed no evidence of significant arterial insufficiency.  Carotid ultrasound obtained on 11.4.22 demonstrated no hemodynamically significant stenosis to bilateral internal carotid arteries.     Today the patient reports that he feels good.  He reports resolution of his chest pain symptoms with the addition of Imdur.  He denies any shortness of breath, orthopnea,  PND,  palpitations, lightheadedness or syncope.  He states his exertional dyspnea and dizziness symptoms also significantly improved after he was treated for acute sinus issues.  He states he is able to perform his normal ADLs have care for his grand child without experiencing any ischemic symptoms.  Of note, the patient reports that he has not taking his medication for approximately 2 weeks due to financial issues.  Of note, the patient reports that he has increased his smoking to 1.5 pack of cigarettes a day due to recent increased stress.       Nuclear Stress - Cardiology Interpreted  8.19.22    Interpretation Summary    Abnormal myocardial perfusion scan.    There is a moderate to large sized, fixed perfusion abnormality consistent with scar in the basal to distal inferoapical wall(s) in the typical distribution of the RCA territory.    There are no other significant perfusion abnormalities.    The gated perfusion images showed an ejection fraction of 45% at rest. The gated perfusion images showed an ejection fraction of 48% post stress.    The EKG portion of this study is negative for ischemia.    The patient reported no chest pain during the stress test.    There were no arrhythmias during stress.    Scar present     TTE-09/08/2020  The study quality is average  The left ventricle is normal in size.  Global left ventricular systolic function is borderline normal.  The left ventricular ejection fraction is 50%.    The left ventricle diastolic function is impaired grade 1 with normal left atrial pressure  The left atrial diameter is mildly increased  Mild 1+ mitral regurgitation  Insufficient TR to estimate RVSP        Left heart catheterization 11/03/2019  Procedure summary  Acute inferior MI  PTCA/stent under proximal RCA 0%     Left heart catheterization 09/23/2020  PTA/stent Diagonal 1( ISR) 80- 90% to 10%.                                                                                                                                                                                            Patient Active Problem List   Diagnosis    Coronary artery disease involving native coronary artery of native heart without angina pectoris    HTN (hypertension), benign    Mixed hyperlipidemia    Tobacco use     Past Surgical History:   Procedure Laterality Date    coronary stents        Social History     Socioeconomic History    Marital status:    Tobacco Use    Smoking status: Every Day     Packs/day: 1.00     Years: 30.00     Pack years: 30.00     Types: Cigarettes    Smokeless tobacco: Never    Substance and Sexual Activity    Alcohol use: Not Currently    Drug use: Never        Family History   Problem Relation Age of Onset    Heart disease Mother     Hyperlipidemia Mother     Hypertension Mother     Cancer Father          Current Outpatient Medications:     albuterol (PROVENTIL/VENTOLIN HFA) 90 mcg/actuation inhaler, Inhale 2 puffs into the lungs every 4 (four) hours as needed., Disp: , Rfl:     atorvastatin (LIPITOR) 80 MG tablet, Take 1 tablet (80 mg total) by mouth every evening. (Patient not taking: Reported on 1/12/2023), Disp: 90 tablet, Rfl: 3    buPROPion (WELLBUTRIN SR) 100 MG TBSR 12 hr tablet, Take 100 mg by mouth 2 (two) times a day., Disp: , Rfl:     clopidogreL (PLAVIX) 75 mg tablet, Take 1 tablet (75 mg total) by mouth once daily. (Patient not taking: Reported on 1/12/2023), Disp: 90 tablet, Rfl: 3    isosorbide mononitrate (IMDUR) 30 MG 24 hr tablet, Take 1 tablet (30 mg total) by mouth once daily. (Patient not taking: Reported on 1/12/2023), Disp: 30 tablet, Rfl: 6    lisinopriL (PRINIVIL,ZESTRIL) 20 MG tablet, Take 1 tablet (20 mg total) by mouth once daily. (Patient not taking: Reported on 1/12/2023), Disp: 90 tablet, Rfl: 3    metoprolol tartrate (LOPRESSOR) 25 MG tablet, Take 1 tablet (25 mg total) by mouth once daily. (Patient not taking: Reported on 1/12/2023), Disp: 90 tablet, Rfl: 3    nicotine (NICODERM CQ) 21 mg/24 hr, Place 1 patch onto the skin once daily., Disp: , Rfl:      ROS:                                                                                                                                                                             Review of Systems   Constitutional: Negative.    HENT: Negative.     Eyes: Negative.    Gastrointestinal: Negative.    Genitourinary: Negative.    Musculoskeletal: Negative.    Skin: Negative.    Neurological: Negative.    Endo/Heme/Allergies: Negative.    Psychiatric/Behavioral: Negative.        Blood pressure 124/79, pulse 88,  "temperature 98.1 °F (36.7 °C), resp. rate 18, height 5' 6.93" (1.7 m), weight 98.4 kg (217 lb), SpO2 (!) 94 %.   PE:  Physical Exam  Constitutional:       Appearance: Normal appearance.   HENT:      Head: Normocephalic.   Eyes:      Pupils: Pupils are equal, round, and reactive to light.   Cardiovascular:      Rate and Rhythm: Normal rate and regular rhythm.      Pulses: Normal pulses.   Pulmonary:      Effort: Pulmonary effort is normal.   Musculoskeletal:         General: Normal range of motion.   Skin:     General: Skin is warm and dry.   Neurological:      General: No focal deficit present.      Mental Status: He is alert and oriented to person, place, and time.   Psychiatric:         Mood and Affect: Mood normal.         Behavior: Behavior normal.              ASSESSMENT/PLAN:  CAD  - CAD/STEMI with stenting PCI/RCA in November 2019  - PTCA/RCA (Nov. 2019), PTCA/stenting Diagnal 1 (Sept. 2020)   - LVEF- 50% per TTE 9.28.20  - Denies CP or SOB  - Abnormal nuclear stress test-  moderate to large sized, fixed perfusion abnormality consistent with scar in the basal to distal inferoapical wall(s) in the typical distribution of the RCA territory. No reversible ischemia noted (8.19.22)  - Continue Aspirin, lisinopril, metoprolol tartrate, imdur 30 mg and SL nitro prn CP.  Okay to discontinue Plavix.  Patient has not had any intervention since 2020  - Counseled patient on heart healthy, low-cholesterol diet, smoking cessation and activity as tolerated.       Hypertension- At goal   - Continue current regimen.  Instructed patient to report to financial  department to assess for assistance plan  - Counseled on low sodium diet     Hyperlipidemia, no recent labs   - Continue atorvastatin 80 mg p.o. daily  - Counseled on low-cholesterol low-fat diet  andencouraged exercise as tolerated  - Patient to complete labs      Tobacco use  - Reports smokes a 1.5 packs of cigarettes a day   - Counseled on importance of smoking " cessation. Offered smoking cessation prgram     Complete labs    Discontinue Plavix   Continue ASA daily   Referral to Internal Medicine for establishment of PCP  Follow up in cardiology clinic in 6 months or sooner if needed

## 2023-01-13 ENCOUNTER — TELEPHONE (OUTPATIENT)
Dept: URGENT CARE | Facility: CLINIC | Age: 58
End: 2023-01-13

## 2023-01-14 ENCOUNTER — OFFICE VISIT (OUTPATIENT)
Dept: URGENT CARE | Facility: CLINIC | Age: 58
End: 2023-01-14

## 2023-01-14 VITALS
RESPIRATION RATE: 18 BRPM | OXYGEN SATURATION: 97 % | WEIGHT: 217.63 LBS | BODY MASS INDEX: 34.98 KG/M2 | SYSTOLIC BLOOD PRESSURE: 124 MMHG | TEMPERATURE: 99 F | DIASTOLIC BLOOD PRESSURE: 83 MMHG | HEIGHT: 66 IN | HEART RATE: 81 BPM

## 2023-01-14 DIAGNOSIS — K92.1 HEMATOCHEZIA: Primary | ICD-10-CM

## 2023-01-14 PROCEDURE — 99215 OFFICE O/P EST HI 40 MIN: CPT | Mod: PBBFAC | Performed by: FAMILY MEDICINE

## 2023-01-14 PROCEDURE — 99211 PR OFFICE/OUTPT VISIT, EST, LEVL I: ICD-10-PCS | Mod: S$PBB,,, | Performed by: FAMILY MEDICINE

## 2023-01-14 PROCEDURE — 99211 OFF/OP EST MAY X REQ PHY/QHP: CPT | Mod: S$PBB,,, | Performed by: FAMILY MEDICINE

## 2023-01-14 RX ORDER — BUPROPION HYDROCHLORIDE 150 MG/1
TABLET, EXTENDED RELEASE ORAL
COMMUNITY
Start: 2023-01-12 | End: 2023-02-09 | Stop reason: SDUPTHER

## 2023-01-14 NOTE — PROGRESS NOTES
"Subjective:       Patient ID: Paco Culp is a 57 y.o. male.    Vitals:  height is 5' 6" (1.676 m) and weight is 98.7 kg (217 lb 9.5 oz). His temperature is 98.6 °F (37 °C). His blood pressure is 124/83 and his pulse is 81. His respiration is 18 and oxygen saturation is 97%.     Chief Complaint: Labs Only (Was called to return for lab work)    HPI    Patient evidently was called to return to urgent care to have more blood drawn for PT INR.  Has had episodic hematochezia for 2 years, being set [facility] with [facility] currently.  He is in no acute distress.  Has a history of CAD with stents.  Currently asymptomatic  ROS    Objective:      Physical Exam   Constitutional: He appears well-developed. No distress.   Cardiovascular: Normal rate and regular rhythm.   Pulmonary/Chest: Effort normal and breath sounds normal.   Abdominal: He exhibits no distension.   Musculoskeletal: Normal range of motion.         General: Normal range of motion.   Skin: Skin is warm and dry.   Nursing note and vitals reviewed.      Assessment:       1. Hematochezia            Plan:         Hematochezia         Patient has been followed by local cardiologist and likely has had coags done numerous times in the past.  He needs a colonoscopy.  Keep [facility] appointments when contacted.  Further lab work was not drawn today            "

## 2023-01-20 ENCOUNTER — HOSPITAL ENCOUNTER (EMERGENCY)
Facility: HOSPITAL | Age: 58
Discharge: HOME | End: 2023-01-20
Attending: STUDENT IN AN ORGANIZED HEALTH CARE EDUCATION/TRAINING PROGRAM
Payer: OTHER MISCELLANEOUS

## 2023-01-20 VITALS
OXYGEN SATURATION: 95 % | DIASTOLIC BLOOD PRESSURE: 87 MMHG | WEIGHT: 217 LBS | SYSTOLIC BLOOD PRESSURE: 138 MMHG | TEMPERATURE: 98 F | HEIGHT: 67 IN | BODY MASS INDEX: 34.06 KG/M2 | HEART RATE: 90 BPM | RESPIRATION RATE: 20 BRPM
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
## 2023-01-20 DIAGNOSIS — S20.222A BACK CONTUSION, LEFT, INITIAL ENCOUNTER: ICD-10-CM

## 2023-01-20 PROCEDURE — 99284 EMERGENCY DEPT VISIT MOD MDM: CPT | Mod: 25

## 2023-01-20 PROCEDURE — 25000003 PHARM REV CODE 250: Performed by: STUDENT IN AN ORGANIZED HEALTH CARE EDUCATION/TRAINING PROGRAM

## 2023-01-20 RX ORDER — HYDROCODONE BITARTRATE AND ACETAMINOPHEN 5; 325 MG/1; MG/1
1 TABLET ORAL
Status: COMPLETED | OUTPATIENT
Start: 2023-01-20 | End: 2023-01-20

## 2023-01-20 RX ORDER — HYDROCODONE BITARTRATE AND ACETAMINOPHEN 5; 325 MG/1; MG/1
1 TABLET ORAL EVERY 6 HOURS PRN
Qty: 12 TABLET | Refills: 0 | Status: SHIPPED | OUTPATIENT
Start: 2023-01-20 | End: 2023-02-01

## 2023-01-20 RX ADMIN — HYDROCODONE BITARTRATE AND ACETAMINOPHEN 1 TABLET: 5; 325 TABLET ORAL at 10:01
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
## 2023-01-31 ENCOUNTER — OFFICE VISIT (OUTPATIENT)
Dept: GASTROENTEROLOGY | Facility: CLINIC | Age: 58
End: 2023-01-31

## 2023-01-31 VITALS
TEMPERATURE: 98 F | WEIGHT: 215 LBS | BODY MASS INDEX: 32.58 KG/M2 | DIASTOLIC BLOOD PRESSURE: 77 MMHG | RESPIRATION RATE: 18 BRPM | SYSTOLIC BLOOD PRESSURE: 118 MMHG | OXYGEN SATURATION: 96 % | HEIGHT: 68 IN | HEART RATE: 73 BPM

## 2023-01-31 DIAGNOSIS — R10.30 LOWER ABDOMINAL PAIN: ICD-10-CM

## 2023-01-31 DIAGNOSIS — K92.1 HEMATOCHEZIA: Primary | ICD-10-CM

## 2023-01-31 DIAGNOSIS — Z72.0 TOBACCO USER: ICD-10-CM

## 2023-01-31 PROCEDURE — 99204 OFFICE O/P NEW MOD 45 MIN: CPT | Mod: S$PBB,,, | Performed by: NURSE PRACTITIONER

## 2023-01-31 PROCEDURE — 99204 PR OFFICE/OUTPT VISIT, NEW, LEVL IV, 45-59 MIN: ICD-10-PCS | Mod: S$PBB,,, | Performed by: NURSE PRACTITIONER

## 2023-01-31 PROCEDURE — 99215 OFFICE O/P EST HI 40 MIN: CPT | Mod: PBBFAC | Performed by: NURSE PRACTITIONER

## 2023-01-31 RX ORDER — POLYETHYLENE GLYCOL 3350, SODIUM SULFATE, SODIUM CHLORIDE, POTASSIUM CHLORIDE, SODIUM ASCORBATE, AND ASCORBIC ACID 7.5-2.691G
2000 KIT ORAL ONCE
Qty: 1 KIT | Refills: 0 | Status: SHIPPED | OUTPATIENT
Start: 2023-01-31 | End: 2023-01-31

## 2023-01-31 NOTE — PROGRESS NOTES
Subjective:       Patient ID: Paco Culp is a 57 y.o. male.    Chief Complaint: Abdominal Cramping (nausea), Abdominal Pain, and Rectal Bleeding    This 57-year-old  male with a history of hemorrhoidectomy, CAD s/p stent placement x 4, hypertension, and hyperlipidemia is referred for abdominal pain and rectal bleeding.  He presents unaccompanied.  He reports intermittent red blood with bowel movements noted on the tissue after wiping approximately 4-5 times per week with almost every bowel movement.  This has been ongoing for the past few years.  He has intermittent lower abdominal pain described as sharp and radiating across his lower abdominal region.  Abdominal pain seems to be noted more on an empty stomach and is somewhat relieved with eating.  He thinks that he has lost approximately 7 lb in the past 6-7 months.  He has occasional nausea without vomiting.  His appetite is good and his weight is stable.  He denies nocturnal symptoms, fever, chills, vomiting, hematemesis, odynophagia, dysphagia, acid reflux, pyrosis, or early satiety.  He denies melena, fecal urgency, fecal incontinence, or pain with defecation.    Abdominal x-ray January 12, 2023 was unremarkable.  CBC and CMP were unremarkable January 12, 2023.    He denies ever having an EGD or colonoscopy done. He takes aspirin 81 mg daily. He smokes 2 packs of cigarettes daily and denies alcohol use. He denies illicit drug use. His paternal grandfather has a history of throat cancer. His father has a history of cancer but he is unsure what type of cancer.    Review of patient's allergies indicates:  No Known Allergies    Past Medical History:   Diagnosis Date    Coronary artery disease     Hyperlipidemia     Hypertension      Past Surgical History:   Procedure Laterality Date    coronary stents        Family History:   family history includes Cancer in his father; Heart disease in his mother; Hyperlipidemia in his mother; Hypertension in his  mother.    Social History:    reports that he has been smoking cigarettes. He has a 60.00 pack-year smoking history. He has never used smokeless tobacco. He reports that he does not currently use alcohol. He reports that he does not use drugs.    Review of Systems  Negative except as noted in the HPI.      Objective:      Physical Exam  Constitutional:       Appearance: Normal appearance.   HENT:      Head: Normocephalic.      Mouth/Throat:      Mouth: Mucous membranes are moist.   Eyes:      Extraocular Movements: Extraocular movements intact.      Conjunctiva/sclera: Conjunctivae normal.      Pupils: Pupils are equal, round, and reactive to light.   Cardiovascular:      Rate and Rhythm: Normal rate and regular rhythm.      Pulses: Normal pulses.      Heart sounds: Normal heart sounds.   Pulmonary:      Effort: Pulmonary effort is normal.      Breath sounds: Normal breath sounds.   Abdominal:      General: Bowel sounds are normal.      Palpations: Abdomen is soft.      Comments: Abdominal tenderness noted across lower abdominal region with deep palpation, no rebound or guarding   Musculoskeletal:         General: Normal range of motion.      Cervical back: Normal range of motion and neck supple.   Skin:     General: Skin is warm and dry.   Neurological:      General: No focal deficit present.      Mental Status: He is alert and oriented to person, place, and time.   Psychiatric:         Mood and Affect: Mood normal.         Behavior: Behavior normal.         Thought Content: Thought content normal.         Judgment: Judgment normal.       Home Medications:     Current Outpatient Medications   Medication Sig    albuterol (PROVENTIL/VENTOLIN HFA) 90 mcg/actuation inhaler Inhale 2 puffs into the lungs every 4 (four) hours as needed.    aspirin (ECOTRIN) 81 MG EC tablet Take 1 tablet (81 mg total) by mouth once daily.    atorvastatin (LIPITOR) 80 MG tablet Take 1 tablet (80 mg total) by mouth every evening.     buPROPion (WELLBUTRIN SR) 100 MG TBSR 12 hr tablet Take 100 mg by mouth 2 (two) times a day.    HYDROcodone-acetaminophen (NORCO) 5-325 mg per tablet Take 1 tablet by mouth every 6 (six) hours as needed for Pain.    isosorbide mononitrate (IMDUR) 30 MG 24 hr tablet Take 1 tablet (30 mg total) by mouth once daily.    lisinopriL (PRINIVIL,ZESTRIL) 20 MG tablet Take 1 tablet (20 mg total) by mouth once daily.    metoprolol tartrate (LOPRESSOR) 25 MG tablet Take 1 tablet (25 mg total) by mouth once daily.    buPROPion (WELLBUTRIN SR) 150 MG TBSR 12 hr tablet Take by mouth.    nicotine (NICODERM CQ) 21 mg/24 hr Place 1 patch onto the skin once daily.     Laboratory Results:     Recent Results (from the past 2016 hour(s))   Comprehensive Metabolic Panel    Collection Time: 01/12/23 12:29 PM   Result Value Ref Range    Sodium Level 140 136 - 145 mmol/L    Potassium Level 4.6 3.5 - 5.1 mmol/L    Chloride 103 98 - 107 mmol/L    Carbon Dioxide 29 22 - 29 mmol/L    Glucose Level 79 74 - 100 mg/dL    Blood Urea Nitrogen 13.0 8.4 - 25.7 mg/dL    Creatinine 1.13 0.73 - 1.18 mg/dL    Calcium Level Total 9.5 8.4 - 10.2 mg/dL    Protein Total 7.2 6.4 - 8.3 gm/dL    Albumin Level 3.9 3.5 - 5.0 g/dL    Globulin 3.3 2.4 - 3.5 gm/dL    Albumin/Globulin Ratio 1.2 1.1 - 2.0 ratio    Bilirubin Total 0.4 <=1.5 mg/dL    Alkaline Phosphatase 93 40 - 150 unit/L    Alanine Aminotransferase 27 0 - 55 unit/L    Aspartate Aminotransferase 17 5 - 34 unit/L    eGFR >60 mls/min/1.73/m2   CBC with Differential    Collection Time: 01/12/23 12:29 PM   Result Value Ref Range    WBC 10.3 4.5 - 11.5 x10(3)/mcL    RBC 5.60 4.70 - 6.10 x10(6)/mcL    Hgb 17.1 14.0 - 18.0 gm/dL    Hct 51.0 42.0 - 52.0 %    MCV 91.1 80.0 - 94.0 fL    MCH 30.5 pg    MCHC 33.5 33.0 - 36.0 mg/dL    RDW 12.8 11.5 - 17.0 %    Platelet 347 130 - 400 x10(3)/mcL    MPV 9.9 7.4 - 10.4 fL    Neut % 57.5 %    Lymph % 32.3 %    Mono % 7.0 %    Eos % 2.1 %    Basophil % 0.4 %    Lymph #  3.34 0.6 - 4.6 x10(3)/mcL    Neut # 5.94 2.1 - 9.2 x10(3)/mcL    Mono # 0.72 0.1 - 1.3 x10(3)/mcL    Eos # 0.22 0 - 0.9 x10(3)/mcL    Baso # 0.04 0 - 0.2 x10(3)/mcL    IG# 0.07 (H) 0 - 0.04 x10(3)/mcL    IG% 0.7 %    NRBC% 0.0 %     Imaging Results:     Narrative & Impression  EXAMINATION:  XR ABDOMEN FLAT AND ERECT     CLINICAL HISTORY:  BRBPR and abd pain; 7# weight loss;Hemorrhage of anus and rectum     TECHNIQUE:  Two views     COMPARISON:  None available     FINDINGS:  There is moderate colonic fecal loading.  The intestinal gas pattern is nonspecific and nonobstructive. No air fluid levels or pneumoperitoneum identified.     Impression:     Nonspecific bowel gas pattern.      Electronically signed by: Kaden Silva  Date:                                            01/12/2023  Time:                                           12:21    Assessment/Plan:     Problem List Items Addressed This Visit          GI    Hematochezia - Primary     Abdominal x-ray January 12, 2023 was unremarkable.    CBC and CMP were unremarkable January 12, 2023.  Recommend soluble fiber supplementation  Avoid straining or sitting on the toilet for long periods of time  Colonoscopy  Hemorrhoidal banding discussed  Recommend tobacco cessation  Call with updates  ER precautions provided         Relevant Medications    polyethylene glycol (MOVIPREP) 100-7.5-2.691 gram solution    Other Relevant Orders    Case Request Endoscopy: COLONOSCOPY (Completed)    Lower abdominal pain     See above         Relevant Medications    polyethylene glycol (MOVIPREP) 100-7.5-2.691 gram solution    Other Relevant Orders    Case Request Endoscopy: COLONOSCOPY (Completed)       Other    Tobacco user     Recommend tobacco cessation.         Relevant Orders    Ambulatory referral/consult to Smoking Cessation Program

## 2023-01-31 NOTE — ASSESSMENT & PLAN NOTE
Abdominal x-ray January 12, 2023 was unremarkable.    CBC and CMP were unremarkable January 12, 2023.  Recommend soluble fiber supplementation  Avoid straining or sitting on the toilet for long periods of time  Colonoscopy  Hemorrhoidal banding discussed  Recommend tobacco cessation  Call with updates  ER precautions provided

## 2023-02-02 ENCOUNTER — HOSPITAL ENCOUNTER (OUTPATIENT)
Facility: HOSPITAL | Age: 58
Discharge: HOME | End: 2023-02-02
Attending: INTERNAL MEDICINE | Admitting: INTERNAL MEDICINE

## 2023-02-02 ENCOUNTER — ANESTHESIA EVENT (OUTPATIENT)
Dept: ENDOSCOPY | Facility: HOSPITAL | Age: 58
End: 2023-02-02

## 2023-02-02 ENCOUNTER — ANESTHESIA (OUTPATIENT)
Dept: ENDOSCOPY | Facility: HOSPITAL | Age: 58
End: 2023-02-02

## 2023-02-02 DIAGNOSIS — R10.30 LOWER ABDOMINAL PAIN: ICD-10-CM

## 2023-02-02 DIAGNOSIS — D12.4 ADENOMATOUS POLYP OF DESCENDING COLON: ICD-10-CM

## 2023-02-02 DIAGNOSIS — K92.1 HEMATOCHEZIA: ICD-10-CM

## 2023-02-02 DIAGNOSIS — D12.5 ADENOMATOUS POLYP OF SIGMOID COLON: Primary | ICD-10-CM

## 2023-02-02 PROCEDURE — 25000003 PHARM REV CODE 250: Performed by: INTERNAL MEDICINE

## 2023-02-02 PROCEDURE — 45385 COLONOSCOPY W/LESION REMOVAL: CPT | Mod: ,,, | Performed by: INTERNAL MEDICINE

## 2023-02-02 PROCEDURE — 25000003 PHARM REV CODE 250: Performed by: SPECIALIST

## 2023-02-02 PROCEDURE — 45385 PR COLONOSCOPY,REMV LESN,SNARE: ICD-10-PCS | Mod: ,,, | Performed by: INTERNAL MEDICINE

## 2023-02-02 PROCEDURE — 25000003 PHARM REV CODE 250: Performed by: NURSE ANESTHETIST, CERTIFIED REGISTERED

## 2023-02-02 PROCEDURE — 37000008 HC ANESTHESIA 1ST 15 MINUTES: Performed by: INTERNAL MEDICINE

## 2023-02-02 PROCEDURE — 63600175 PHARM REV CODE 636 W HCPCS

## 2023-02-02 PROCEDURE — 88305 TISSUE EXAM BY PATHOLOGIST: CPT | Performed by: INTERNAL MEDICINE

## 2023-02-02 PROCEDURE — 63600175 PHARM REV CODE 636 W HCPCS: Performed by: INTERNAL MEDICINE

## 2023-02-02 PROCEDURE — 94640 AIRWAY INHALATION TREATMENT: CPT

## 2023-02-02 PROCEDURE — 27201423 OPTIME MED/SURG SUP & DEVICES STERILE SUPPLY: Performed by: INTERNAL MEDICINE

## 2023-02-02 PROCEDURE — 25000242 PHARM REV CODE 250 ALT 637 W/ HCPCS: Performed by: SPECIALIST

## 2023-02-02 PROCEDURE — 45385 COLONOSCOPY W/LESION REMOVAL: CPT | Performed by: INTERNAL MEDICINE

## 2023-02-02 PROCEDURE — 37000009 HC ANESTHESIA EA ADD 15 MINS: Performed by: INTERNAL MEDICINE

## 2023-02-02 RX ORDER — METOPROLOL TARTRATE 25 MG/1
25 TABLET, FILM COATED ORAL ONCE
Status: COMPLETED | OUTPATIENT
Start: 2023-02-02 | End: 2023-02-02

## 2023-02-02 RX ORDER — NITROGLYCERIN 0.6 MG/1
0.4 TABLET SUBLINGUAL EVERY 5 MIN PRN
COMMUNITY
End: 2023-05-18

## 2023-02-02 RX ORDER — IPRATROPIUM BROMIDE AND ALBUTEROL SULFATE 2.5; .5 MG/3ML; MG/3ML
3 SOLUTION RESPIRATORY (INHALATION) ONCE
Status: COMPLETED | OUTPATIENT
Start: 2023-02-02 | End: 2023-02-02

## 2023-02-02 RX ORDER — LIDOCAINE HYDROCHLORIDE 20 MG/ML
INJECTION INTRAVENOUS
Status: DISCONTINUED | OUTPATIENT
Start: 2023-02-02 | End: 2023-02-02

## 2023-02-02 RX ORDER — PROPOFOL 10 MG/ML
INJECTION, EMULSION INTRAVENOUS CONTINUOUS PRN
Status: DISCONTINUED | OUTPATIENT
Start: 2023-02-02 | End: 2023-02-02

## 2023-02-02 RX ORDER — SODIUM CHLORIDE, SODIUM LACTATE, POTASSIUM CHLORIDE, CALCIUM CHLORIDE 600; 310; 30; 20 MG/100ML; MG/100ML; MG/100ML; MG/100ML
INJECTION, SOLUTION INTRAVENOUS CONTINUOUS
Status: DISCONTINUED | OUTPATIENT
Start: 2023-02-02 | End: 2023-02-02 | Stop reason: HOSPADM

## 2023-02-02 RX ADMIN — LIDOCAINE HYDROCHLORIDE 60 MG: 20 INJECTION, SOLUTION INTRAVENOUS at 07:02

## 2023-02-02 RX ADMIN — IPRATROPIUM BROMIDE AND ALBUTEROL SULFATE 3 ML: 2.5; .5 SOLUTION RESPIRATORY (INHALATION) at 07:02

## 2023-02-02 RX ADMIN — METOPROLOL TARTRATE 25 MG: 25 TABLET, FILM COATED ORAL at 06:02

## 2023-02-02 RX ADMIN — SODIUM CHLORIDE, POTASSIUM CHLORIDE, SODIUM LACTATE AND CALCIUM CHLORIDE: 600; 310; 30; 20 INJECTION, SOLUTION INTRAVENOUS at 07:02

## 2023-02-02 RX ADMIN — Medication 1 ENEMA: at 07:02

## 2023-02-02 RX ADMIN — PROPOFOL 65 MCG/KG/MIN: 10 INJECTION, EMULSION INTRAVENOUS at 07:02

## 2023-02-02 RX ADMIN — PROPOFOL 70 MCG/KG/MIN: 10 INJECTION, EMULSION INTRAVENOUS at 07:02

## 2023-02-02 NOTE — ANESTHESIA POSTPROCEDURE EVALUATION
Anesthesia Post Evaluation    Patient: Paco Culp    Procedure(s) Performed: Procedure(s) (LRB):  COLONOSCOPY, WITH POLYPECTOMY USING SNARE (N/A)  COLONOSCOPY, WITH DIRECTED SUBMUCOSAL INJECTION    Final Anesthesia Type: general      Patient location during evaluation: GI PACU  Patient participation: Yes- Able to Participate  Level of consciousness: awake and alert  Post-procedure vital signs: reviewed and stable  Pain management: adequate  Airway patency: patent    PONV status at discharge: No PONV  Anesthetic complications: no      Cardiovascular status: hemodynamically stable  Respiratory status: unassisted, room air and spontaneous ventilation  Hydration status: euvolemic  Follow-up not needed.        No case tracking events are documented in the log.      Pain/Lauren Score: No data recorded       Detailed exam

## 2023-02-02 NOTE — TRANSFER OF CARE
Anesthesia Transfer of Care Note    Patient: Paco Culp    Procedure(s) Performed: Procedure(s) (LRB):  COLONOSCOPY, WITH POLYPECTOMY USING SNARE (N/A)  COLONOSCOPY, WITH DIRECTED SUBMUCOSAL INJECTION    Patient location: GI    Anesthesia Type: general    Post pain: adequate analgesia    Post assessment: no apparent anesthetic complications    Post vital signs: stable    Level of consciousness: awake    Nausea/Vomiting: no nausea/vomiting    Complications: none    Transfer of care protocol was followed      Last vitals:   Visit Vitals  /78 (BP Location: Left arm, Patient Position: Lying)   Pulse 75   Temp 36.4 °C (97.5 °F) (Oral)   Resp 18   SpO2 97%

## 2023-02-02 NOTE — [employer]ATION PATIENT INSTRUCTIONS
Discharge Summary/Instructions after an Endoscopic Procedure  Patient Name: Paco Culp  Patient MRN: [id number]  Patient YOB: 1965  Thursday, February 2, 2023  Grayson Demarco MD  Dear patient,  As a result of recent federal legislation (The Federal Cures Act), you may   receive lab or pathology results from your procedure in your [facility]   account before your physician is able to contact you. Your physician or   their representative will relay the results to you with their   recommendations at their soonest availability.  Thank you,  RESTRICTIONS:  During your procedure today, you received medications for sedation.  These   medications may affect your judgment, balance and coordination.  Therefore,   for 24 hours, you have the following restrictions:   - DO NOT drive a car, operate machinery, make legal/financial decisions,   sign important papers or drink alcohol.    ACTIVITY:  Today: no heavy lifting, straining or running due to procedural   sedation/anesthesia.  The following day: return to full activity including work.  DIET:  Eat and drink normally unless instructed otherwise.     TREATMENT FOR COMMON SIDE EFFECTS:  - Mild abdominal pain, nausea, belching, bloating or excessive gas:  rest,   eat lightly and use a heating pad.  - Sore Throat: treat with throat lozenges and/or gargle with warm salt   water.  - Because air was used during the procedure, expelling large amounts of air   from your rectum or belching is normal.  - If a bowel prep was taken, you may not have a bowel movement for 1-3 days.    This is normal.  SYMPTOMS TO WATCH FOR AND REPORT TO YOUR PHYSICIAN:  1. Abdominal pain or bloating, other than gas cramps.  2. Chest pain.  3. Back pain.  4. Signs of infection such as: chills or fever occurring within 24 hours   after the procedure.  5. Rectal bleeding, which would show as bright red, maroon, or black stools.   (A tablespoon of blood from the rectum is not serious,  especially if   hemorrhoids are present.)  6. Vomiting.  7. Weakness or dizziness.  GO DIRECTLY TO THE NEAREST EMERGENCY ROOM IF YOU HAVE ANY OF THE FOLLOWING:      Difficulty breathing              Chills and/or fever over 101 F   Persistent vomiting and/or vomiting blood   Severe abdominal pain   Severe chest pain   Black, tarry stools   Bleeding- more than one tablespoon   Any other symptom or condition that you feel may need urgent attention  Your doctor recommends these additional instructions:  If any biopsies were taken, your doctors clinic will contact you in 1 to 2   weeks with any results.  - Return to GI clinic in 3 months for flex sigmoid oscopy if no sign of   malignancy.   - Discharge patient to home (ambulatory).   - Perform a flexible sigmoidoscopy to review polypectomy site in 3 months.  For questions, problems or results please call your physician - Grayson Demarco MD at .  [facility] , EMERGENCY ROOM PHONE NUMBER: [phone]  IF A COMPLICATION OR EMERGENCY SITUATION ARISES AND YOU ARE UNABLE TO REACH   YOUR PHYSICIAN - GO DIRECTLY TO THE EMERGENCY ROOM.  MD Grayson Beckwith MD  2/2/2023 9:16:18 AM  This report has been verified and signed electronically.  Dear patient,  As a result of recent federal legislation (The Federal Cures Act), you may   receive lab or pathology results from your procedure in your [facility]   account before your physician is able to contact you. Your physician or   their representative will relay the results to you with their   recommendations at their soonest availability.  Thank you,  PROVATION

## 2023-02-02 NOTE — H&P
Colonoscopy History and Physical    Patient Name: Paco Culp  MRN: [id number]  : 1965  Date of Procedure:  2023  Referring Physician: Grayson Demarco MD  Primary Physician: Primary Doctor No  Procedure Physician: Grayson Demarco MD, MPH     Procedure - Colonoscopy  ASA - per anesthesia  Mallampati - per anesthesia  History of Anesthesia problems - no  Family history Anesthesia problems -  no     Diagnosis: rectal bleeding  Chief Complaint: Same as above    HPI: Patient is an 57 y.o. male is here for the above. He has a several year history of rectal bleeding with increasing frequency. He has never been evaluated for this before. He has a previous history of hemorrhoidectomy over 20 years ago. He is a smoker.     Last colonoscopy: No previous colonoscopy   Family history: Father with previous cancer, but unsure what type  Anticoagulation: ASA 81    ROS:  Constitutional: No fevers, chills, No weight loss  CV: No chest pain  Pulm: No cough, No shortness of breath  GI: see HPI    Medical History:   Past Medical History:   Diagnosis Date    Coronary artery disease     Hyperlipidemia     Hypertension     STEMI (ST elevation myocardial infarction)          Surgical History:   Past Surgical History:   Procedure Laterality Date    coronary stents       EXCISIONAL HEMORRHOIDECTOMY         Family History:   Family History   Problem Relation Age of Onset    Heart disease Mother     Hyperlipidemia Mother     Hypertension Mother     Cancer Father    .    Social History:   Social History     Socioeconomic History    Marital status:    Tobacco Use    Smoking status: Every Day     Packs/day: 2.00     Years: 30.00     Pack years: 60.00     Types: Cigarettes    Smokeless tobacco: Never   Substance and Sexual Activity    Alcohol use: Not Currently    Drug use: Never    Sexual activity: Not Currently     Partners: Female       Review of patient's allergies indicates:  No Known Allergies    Medications:    Medications Prior to Admission   Medication Sig Dispense Refill Last Dose    albuterol (PROVENTIL/VENTOLIN HFA) 90 mcg/actuation inhaler Inhale 2 puffs into the lungs every 4 (four) hours as needed.       aspirin (ECOTRIN) 81 MG EC tablet Take 1 tablet (81 mg total) by mouth once daily. 90 tablet 3     atorvastatin (LIPITOR) 80 MG tablet Take 1 tablet (80 mg total) by mouth every evening. 90 tablet 3     buPROPion (WELLBUTRIN SR) 150 MG TBSR 12 hr tablet Take by mouth.       isosorbide mononitrate (IMDUR) 30 MG 24 hr tablet Take 1 tablet (30 mg total) by mouth once daily. 90 tablet 3     lisinopriL (PRINIVIL,ZESTRIL) 20 MG tablet Take 1 tablet (20 mg total) by mouth once daily. 90 tablet 3     metoprolol tartrate (LOPRESSOR) 25 MG tablet Take 1 tablet (25 mg total) by mouth once daily. 90 tablet 3     nicotine (NICODERM CQ) 21 mg/24 hr Place 1 patch onto the skin once daily.       nitroglycerin (NITROSTAT) 0.6 MG Subl Place 0.4 mg under the tongue every 5 (five) minutes as needed.       [DISCONTINUED] buPROPion (WELLBUTRIN SR) 100 MG TBSR 12 hr tablet Take 100 mg by mouth 2 (two) times a day.            Physical Exam:    Vital Signs:   Vitals:    02/02/23 0659   BP: 138/78   Pulse: 70   Resp: 20   Temp: 97.5 °F (36.4 °C)     /78 (BP Location: Left arm, Patient Position: Lying)   Pulse 70   Temp 97.5 °F (36.4 °C) (Oral)   Resp 20   SpO2 100%     General:          Well appearing in no acute distress  Lungs: Clear to auscultation bilaterally, respirations unlabored  Heart: Regular rate and rhythm, S1 and S2 normal, no obvious murmurs  Abdomen:         Soft, non-tender, bowel sounds normal, no masses, no organomegaly        Labs:  Lab Results   Component Value Date    WBC 10.3 01/12/2023    HGB 17.1 01/12/2023    HCT 51.0 01/12/2023    MCV 91.1 01/12/2023     01/12/2023     No results found for: INR, PT, APTT  Lab Results   Component Value Date     01/12/2023    K 4.6 01/12/2023    CO2 29  01/12/2023    BUN 13.0 01/12/2023    CREATININE 1.13 01/12/2023    LABPROT 7.2 01/12/2023    ALBUMIN 3.9 01/12/2023    BILITOT 0.4 01/12/2023    ALKPHOS 93 01/12/2023    ALT 27 01/12/2023    AST 17 01/12/2023         Assessment and Plan:  Paco Culp is a 58 yo M who presents with rectal bleeding here for colonoscopy.     History reviewed, vital signs satisfactory, cardiopulmonary status satisfactory.  I have explained the sedation options, risks, benefits, and alternatives of this endoscopic procedure to the patient including but not limited to bleeding, inflammation, infection, perforation, and death.  All questions were answered and the patient consented to proceed with procedure as planned. The patient is deemed an appropriate candidate for the sedation as planned.    Kane Kohler MD  [employer] General Surgery, PGY2  2/2/2023  7:02 AM

## 2023-02-02 NOTE — DISCHARGE SUMMARY
[facility]  Discharge Note  Short Stay    Procedure(s) (LRB):  COLONOSCOPY, WITH POLYPECTOMY USING SNARE (N/A)  COLONOSCOPY, WITH DIRECTED SUBMUCOSAL INJECTION  After consent was obtained the patient was transferred to the endoscopy suite  , general IV anesthesia was provided by anesthesia Services.  Rectal exam was performed and unremarkable.  The [employer] buy tea video colonoscope was introduced per  OUTCOME:  Rectum and advanced around the colon to the cecum, the ileocecal valve and appendiceal orifice were identified and normal as was the distal terminal ileum.  Careful examination of the ascending and transverse colon revealed no abnormalities.  A 4 mm sessile polyp was identified in the mid descending colon and removed with cold snare.  In the sigmoid colon at 25 cm a 1 cm sessile polyp was identified and removed with hot snare, in the distal sigmoid colon a 2 cm sessile polyp was identified and removed with a combination of hot cold snare.  Colon was tattooed above the 1st polyp and below the 2nd polyp.  The rectum was carefully examined and noted to be normal.  The endoscope was withdrawn.  Withdrawal time cecum to rectum 66 minutes.  The patient was returned to the recovery area for observation.        Patient tolerated treatment/procedure well without complication and is now ready for discharge.  Discharge plan the patient will be discharged to resume his regular diet today and normal activities tomorrow.  He has been advised to come to the emergency room if he has any evidence of significant rectal bleeding.  A follow-up flexible sigmoidoscopy in 3 months is recommended unless the polyps were obviously neoplastic.    Home or Self Care    FINAL DIAGNOSIS:  <principal problem not specified>    FOLLOWUP: With primary care provider    DISCHARGE INSTRUCTIONS:    Discharge Procedure Orders   Diet general     Call MD for:  temperature >100.4     Call MD for:  persistent nausea and vomiting     Call MD  for:  severe uncontrolled pain     Call MD for:  difficulty breathing, headache or visual disturbances     Activity as tolerated         Clinical Reference Documents Added to Patient Instructions         Document    COLONOSCOPY DISCHARGE INSTRUCTIONS (ENGLISH)            TIME SPENT ON DISCHARGE: 10 minutes

## 2023-02-02 NOTE — ANESTHESIA PREPROCEDURE EVALUATION
02/02/2023  Paco Culp is a 57 y.o., male. For CLN      History of hemorrhoidectomy, CAD with Stents x 4 (last 2018, daily ASA)  , HTN, HLD    Missing teeth noted, partial dentures noted , anterior airway     Wheezing pre op --> duoneb      Vitals:    02/02/23 0658 02/02/23 0659   BP: 138/78 138/78   BP Location:  Left arm   Patient Position:  Lying   Pulse: 70 70   Resp:  20   Temp:  36.4 °C (97.5 °F)   TempSrc:  Oral   SpO2:  100%       Reading physician: Jim Herzog MD Ordering physician: SOLEDAD Palm Study date: 8/19/22     Reason for Exam  Priority: Routine  Dx: Coronary artery disease involving native coronary artery of native heart without angina pectoris [I25.10 (ICD-10-CM)]; HTN (hypertension), benign [I10 (ICD-10-CM)]; Mixed hyperlipidemia [E78.2 (ICD-10-CM)]; Tobacco use [Z72.0 (ICD-10-CM)]; Other chest pain [R07.89 (ICD-10-CM)]     Conclusion         Abnormal myocardial perfusion scan.    There is a moderate to large sized, fixed perfusion abnormality consistent with scar in the basal to distal inferoapical wall(s) in the typical distribution of the RCA territory.    There are no other significant perfusion abnormalities.    The gated perfusion images showed an ejection fraction of 45% at rest. The gated perfusion images showed an ejection fraction of 48% post stress.    The EKG portion of this study is negative for ischemia.    The patient reported no chest pain during the stress test.    There were no arrhythmias during stress.          Test Reason : I25.10,     Vent. Rate : 073 BPM     Atrial Rate : 073 BPM      P-R Int : 142 ms          QRS Dur : 082 ms       QT Int : 378 ms       P-R-T Axes : 063 061 075 degrees      QTc Int : 416 ms     Normal sinus rhythm   Normal ECG   No previous ECGs available   Confirmed by Jim Herzog MD ([id number]) on 8/4/2022 9:39:42 AM      Referred By:             Confirmed By:Jim Herzog MD      Specimen Collected: 08/02/22 11:55 Last Resulted: 08/04/22 09:39                physician: Jim Herzog MD Ordering physician: SOLEDAD Palm Study date: 8/19/22     Reason for Exam  Priority: Routine  Dx: Coronary artery disease involving native coronary artery of native heart without angina pectoris [I25.10 (ICD-10-CM)]; HTN (hypertension), benign [I10 (ICD-10-CM)]; Mixed hyperlipidemia [E78.2 (ICD-10-CM)]; Tobacco use [Z72.0 (ICD-10-CM)]; Other chest pain [R07.89 (ICD-10-CM)]     Conclusion         Abnormal myocardial perfusion scan.    There is a moderate to large sized, fixed perfusion abnormality consistent with scar in the basal to distal inferoapical wall(s) in the typical distribution of the RCA territory.    There are no other significant perfusion abnormalities.    The gated perfusion images showed an ejection fraction of 45% at rest. The gated perfusion images showed an ejection fraction of 48% post stress.    The EKG portion of this study is negative for ischemia.    The patient reported no chest pain during the stress test.    There were no arrhythmias during stress.            Active Ambulatory Problems     Diagnosis Date Noted    Atherosclerosis of coronary artery 08/02/2022    Hypertension 08/02/2022    Hyperlipidemia 08/02/2022    Tobacco user 08/02/2022    H/O heart artery stent 01/12/2023    Angina pectoris 01/12/2023    Obesity 01/12/2023    Recurrent sinusitis 01/12/2023    Hematochezia 01/31/2023    Lower abdominal pain 01/31/2023     Resolved Ambulatory Problems     Diagnosis Date Noted    No Resolved Ambulatory Problems     Past Medical History:   Diagnosis Date    Coronary artery disease     STEMI (ST elevation myocardial infarction) 2019       Past Surgical History:   Procedure Laterality Date    coronary stents       EXCISIONAL HEMORRHOIDECTOMY         Lab Results   Component Value Date     WBC 10.3 01/12/2023    HGB 17.1 01/12/2023    HCT 51.0 01/12/2023     01/12/2023    ALT 27 01/12/2023    AST 17 01/12/2023     01/12/2023    K 4.6 01/12/2023    CREATININE 1.13 01/12/2023    BUN 13.0 01/12/2023    CO2 29 01/12/2023         Pre-op Assessment    I have reviewed the Patient Summary Reports.     I have reviewed the Nursing Notes. I have reviewed the NPO Status.   I have reviewed the Medications.     Review of Systems  Anesthesia Hx:  No problems with previous Anesthesia  History of prior surgery of interest to airway management or planning: Denies Family Hx of Anesthesia complications.   Denies Personal Hx of Anesthesia complications.   Social:  Smoker    Hematology/Oncology:  Hematology Normal   Oncology Normal     EENT/Dental:EENT/Dental Normal   Cardiovascular:   Exercise tolerance: poor Hypertension Past MI CAD   Angina    Pulmonary:  Pulmonary Normal    Renal/:  Renal/ Normal     Hepatic/GI:  Hepatic/GI Normal    Musculoskeletal:  Musculoskeletal Normal    Neurological:  Neurology Normal    Endocrine:  Endocrine Normal    Dermatological:  Skin Normal    Psych:  Psychiatric Normal           Physical Exam  General: Cooperative, Well nourished, Alert and Oriented    Airway:  Mallampati: II / III/ II  Mouth Opening: Normal  TM Distance: Normal  Tongue: Normal  Neck ROM: Normal ROM    Dental:  Dentures, Periodontal disease        Anesthesia Plan  Type of Anesthesia, risks & benefits discussed:    Anesthesia Type: Gen Natural Airway  Intra-op Monitoring Plan: Standard ASA Monitors  Post Op Pain Control Plan: IV/PO Opioids PRN  (medical reason for not using multimodal pain management)  Induction:  IV  Informed Consent: Informed consent signed with the Patient and all parties understand the risks and agree with anesthesia plan.  All questions answered. Patient consented to blood products? No  ASA Score: 3  Day of Surgery Review of History & Physical: H&P Update referred to the  surgeon/provider.    Ready For Surgery From Anesthesia Perspective.     .

## 2023-02-03 VITALS
TEMPERATURE: 98 F | OXYGEN SATURATION: 100 % | DIASTOLIC BLOOD PRESSURE: 56 MMHG | RESPIRATION RATE: 20 BRPM | HEART RATE: 56 BPM | SYSTOLIC BLOOD PRESSURE: 107 MMHG

## 2023-02-07 LAB
ESTROGEN SERPL-MCNC: NORMAL PG/ML
INSULIN SERPL-ACNC: NORMAL U[IU]/ML
LAB AP CLINICAL INFORMATION: NORMAL
LAB AP GROSS DESCRIPTION: NORMAL
LAB AP REPORT FOOTNOTES: NORMAL
T3RU NFR SERPL: NORMAL %

## 2023-02-09 ENCOUNTER — OFFICE VISIT (OUTPATIENT)
Dept: INTERNAL MEDICINE | Facility: CLINIC | Age: 58
End: 2023-02-09

## 2023-02-09 VITALS
SYSTOLIC BLOOD PRESSURE: 113 MMHG | BODY MASS INDEX: 32.49 KG/M2 | HEART RATE: 76 BPM | OXYGEN SATURATION: 97 % | RESPIRATION RATE: 20 BRPM | DIASTOLIC BLOOD PRESSURE: 68 MMHG | TEMPERATURE: 98 F | WEIGHT: 214.38 LBS | HEIGHT: 68 IN

## 2023-02-09 DIAGNOSIS — E78.2 MIXED HYPERLIPIDEMIA: ICD-10-CM

## 2023-02-09 DIAGNOSIS — Z13.9 SCREENING DUE: ICD-10-CM

## 2023-02-09 DIAGNOSIS — J45.909 ASTHMA, UNSPECIFIED ASTHMA SEVERITY, UNSPECIFIED WHETHER COMPLICATED, UNSPECIFIED WHETHER PERSISTENT: ICD-10-CM

## 2023-02-09 DIAGNOSIS — I10 HTN (HYPERTENSION), BENIGN: ICD-10-CM

## 2023-02-09 DIAGNOSIS — J32.1 CHRONIC FRONTAL SINUSITIS: Primary | ICD-10-CM

## 2023-02-09 DIAGNOSIS — R07.89 OTHER CHEST PAIN: ICD-10-CM

## 2023-02-09 DIAGNOSIS — I25.10 CORONARY ARTERY DISEASE INVOLVING NATIVE CORONARY ARTERY OF NATIVE HEART WITHOUT ANGINA PECTORIS: ICD-10-CM

## 2023-02-09 DIAGNOSIS — F17.200 SMOKER: ICD-10-CM

## 2023-02-09 PROCEDURE — 99214 OFFICE O/P EST MOD 30 MIN: CPT | Mod: PBBFAC

## 2023-02-09 RX ORDER — ISOSORBIDE MONONITRATE 30 MG/1
30 TABLET, EXTENDED RELEASE ORAL DAILY
Qty: 90 TABLET | Refills: 3 | Status: SHIPPED | OUTPATIENT
Start: 2023-02-09 | End: 2023-05-18 | Stop reason: SDUPTHER

## 2023-02-09 RX ORDER — ATORVASTATIN CALCIUM 80 MG/1
80 TABLET, FILM COATED ORAL NIGHTLY
Qty: 90 TABLET | Refills: 3 | Status: SHIPPED | OUTPATIENT
Start: 2023-02-09 | End: 2023-07-11 | Stop reason: SDUPTHER

## 2023-02-09 RX ORDER — ASPIRIN 81 MG/1
81 TABLET ORAL DAILY
Qty: 90 TABLET | Refills: 3 | Status: SHIPPED | OUTPATIENT
Start: 2023-02-09 | End: 2024-02-09

## 2023-02-09 RX ORDER — LISINOPRIL 20 MG/1
20 TABLET ORAL DAILY
Qty: 90 TABLET | Refills: 3 | Status: SHIPPED | OUTPATIENT
Start: 2023-02-09 | End: 2023-05-18 | Stop reason: SDUPTHER

## 2023-02-09 RX ORDER — BUPROPION HYDROCHLORIDE 150 MG/1
150 TABLET, EXTENDED RELEASE ORAL DAILY
Qty: 90 TABLET | Refills: 0 | Status: SHIPPED | OUTPATIENT
Start: 2023-02-09 | End: 2023-05-10

## 2023-02-09 RX ORDER — METOPROLOL TARTRATE 25 MG/1
25 TABLET, FILM COATED ORAL DAILY
Qty: 90 TABLET | Refills: 3 | Status: SHIPPED | OUTPATIENT
Start: 2023-02-09 | End: 2023-05-18 | Stop reason: SDUPTHER

## 2023-02-09 RX ORDER — ALBUTEROL SULFATE 90 UG/1
2 AEROSOL, METERED RESPIRATORY (INHALATION) EVERY 4 HOURS PRN
Qty: 18 G | Refills: 3 | Status: SHIPPED | OUTPATIENT
Start: 2023-02-09

## 2023-02-09 NOTE — DISCHARGE SUMMARY
[facility]  Discharge Note  Short Stay    Procedure(s) (LRB):  COLONOSCOPY, WITH POLYPECTOMY USING SNARE (N/A)  COLONOSCOPY, WITH DIRECTED SUBMUCOSAL INJECTION      OUTCOME: Patient tolerated treatment/procedure well without complication and is now ready for discharge.    DISPOSITION: Home or Self Care    FINAL DIAGNOSIS:  <principal problem not specified>    FOLLOWUP: In clinic    DISCHARGE INSTRUCTIONS:    Discharge Procedure Orders   Diet general     Call MD for:  temperature >100.4     Call MD for:  persistent nausea and vomiting     Call MD for:  severe uncontrolled pain     Call MD for:  difficulty breathing, headache or visual disturbances     Activity as tolerated         Clinical Reference Documents Added to Patient Instructions         Document    COLON POLYPS (ENGLISH)    COLONOSCOPY DISCHARGE INSTRUCTIONS (ENGLISH)            TIME SPENT ON DISCHARGE: 10 minutes

## 2023-02-09 NOTE — PROGRESS NOTES
INTERNAL MEDICINE RESIDENT CLINIC  CLINIC NOTE    Patient Name: Paco Culp  YOB: 1965    PRESENTING HISTORY       History of Present Illness:  Mr. Paco Culp is a 57 y.o. male w/ an active medical problem list including CAD status post 4 stents, hyperlipidemia, asthma, recurrent sinusitis, and hypertension who presents to the clinic to establish care.  Patient reports that his only complaint is that he continues to have sinusitis.  He reports that this has been an ongoing and chronic problem for many years.  He reports sinus pressure with associated frontal headache and dry cough that has been intermittent for the past 2 months.  He reports being treated with antibiotics 2 months ago with relief.  No other associated symptoms.  Patient denies shortness of breath, fever, chills, nausea, vomiting, diarrhea, postnasal drip, visual disturbance, numbness, weakness, and tingling.  Reports compliance with all medications.  States that he has an appointment with smoking cessation clinic.  Reports having colonoscopy at the beginning of this week.  Per EMR, multiple polyps removed.  Two of the polyps were sent to pathology with no evidence of malignancy.    CURRENT MEDICATIONS      Current Outpatient Medications on File Prior to Visit   Medication Sig    nitroglycerin (NITROSTAT) 0.6 MG Subl Place 0.4 mg under the tongue every 5 (five) minutes as needed.    [DISCONTINUED] albuterol (PROVENTIL/VENTOLIN HFA) 90 mcg/actuation inhaler Inhale 2 puffs into the lungs every 4 (four) hours as needed.    [DISCONTINUED] aspirin (ECOTRIN) 81 MG EC tablet Take 1 tablet (81 mg total) by mouth once daily.    [DISCONTINUED] atorvastatin (LIPITOR) 80 MG tablet Take 1 tablet (80 mg total) by mouth every evening.    [DISCONTINUED] buPROPion (WELLBUTRIN SR) 150 MG TBSR 12 hr tablet Take by mouth.    [DISCONTINUED] isosorbide mononitrate (IMDUR) 30 MG 24 hr tablet Take 1 tablet (30 mg total) by mouth once daily.     [DISCONTINUED] lisinopriL (PRINIVIL,ZESTRIL) 20 MG tablet Take 1 tablet (20 mg total) by mouth once daily.    [DISCONTINUED] metoprolol tartrate (LOPRESSOR) 25 MG tablet Take 1 tablet (25 mg total) by mouth once daily.    nicotine (NICODERM CQ) 21 mg/24 hr Place 1 patch onto the skin once daily.     No current facility-administered medications on file prior to visit.         Review of Systems   Constitutional:  Negative for chills, fever and weight loss.   HENT:  Positive for congestion and sinus pain. Negative for ear pain and sore throat.    Eyes:  Negative for blurred vision, double vision, photophobia and pain.   Respiratory:  Positive for cough. Negative for hemoptysis, sputum production, shortness of breath and stridor.    Cardiovascular:  Negative for chest pain, palpitations, orthopnea, claudication and leg swelling.   Gastrointestinal:  Negative for abdominal pain, nausea and vomiting.   Genitourinary:  Negative for dysuria, frequency and urgency.   Musculoskeletal:  Negative for back pain.   Skin:  Negative for itching and rash.   Neurological:  Positive for headaches. Negative for dizziness, tingling, focal weakness and weakness.     PAST HISTORY:     Past Medical History:   Diagnosis Date    Coronary artery disease     Hyperlipidemia     Hypertension     STEMI (ST elevation myocardial infarction) 2019       Past Surgical History:   Procedure Laterality Date    COLONOSCOPY, WITH DIRECTED SUBMUCOSAL INJECTION  2/2/2023    Procedure: COLONOSCOPY, WITH DIRECTED SUBMUCOSAL INJECTION;  Surgeon: Grayson Demarco MD;  Location: [facility] ENDOSCOPY;  Service: Endoscopy;;  Tattoo    COLONOSCOPY, WITH POLYPECTOMY USING SNARE N/A 2/2/2023    Procedure: COLONOSCOPY, WITH POLYPECTOMY USING SNARE;  Surgeon: Grayson Demarco MD;  Location: [facility] ENDOSCOPY;  Service: Endoscopy;  Laterality: N/A;    coronary stents       EXCISIONAL HEMORRHOIDECTOMY         Family History   Problem Relation Age of Onset    Heart disease  "Mother     Hyperlipidemia Mother     Hypertension Mother     Cancer Father        Social History     Socioeconomic History    Marital status:    Tobacco Use    Smoking status: Every Day     Packs/day: 2.00     Years: 30.00     Pack years: 60.00     Types: Cigarettes    Smokeless tobacco: Never    Tobacco comments:     Has an appt with smoke cessation   Substance and Sexual Activity    Alcohol use: Not Currently    Drug use: Never    Sexual activity: Not Currently     Partners: Female       Review of patient's allergies indicates:  No Known Allergies    OBJECTIVE:   Vital Signs:  Vitals:    02/09/23 1250   BP: 113/68   Pulse: 76   Resp: 20   Temp: 98.3 °F (36.8 °C)   SpO2: 97%   Weight: 97.3 kg (214 lb 6.4 oz)   Height: 5' 8" (1.727 m)       No results found for this or any previous visit (from the past 24 hour(s)).      Physical Exam  Constitutional:       General: He is not in acute distress.     Appearance: Normal appearance. He is obese. He is not ill-appearing, toxic-appearing or diaphoretic.   HENT:      Head: Normocephalic and atraumatic.      Mouth/Throat:      Mouth: Mucous membranes are moist.      Pharynx: Oropharynx is clear. No oropharyngeal exudate or posterior oropharyngeal erythema.   Eyes:      Extraocular Movements: Extraocular movements intact.      Conjunctiva/sclera: Conjunctivae normal.      Pupils: Pupils are equal, round, and reactive to light.   Cardiovascular:      Rate and Rhythm: Normal rate and regular rhythm.      Pulses: Normal pulses.      Heart sounds: Normal heart sounds. No murmur heard.    No friction rub. No gallop.   Pulmonary:      Effort: Pulmonary effort is normal. No respiratory distress.      Breath sounds: Normal breath sounds. No stridor. No wheezing or rhonchi.   Abdominal:      General: Abdomen is flat. Bowel sounds are normal. There is no distension.      Palpations: There is no mass.      Tenderness: There is no abdominal tenderness. There is no right CVA " tenderness, left CVA tenderness, guarding or rebound.   Musculoskeletal:         General: Normal range of motion.      Cervical back: Normal range of motion and neck supple.      Right lower leg: No edema.      Left lower leg: Edema present.   Skin:     General: Skin is warm.      Coloration: Skin is not jaundiced.      Findings: No bruising, erythema or rash.   Neurological:      General: No focal deficit present.      Mental Status: He is alert and oriented to person, place, and time. Mental status is at baseline.   Psychiatric:         Mood and Affect: Mood normal.         Behavior: Behavior normal.         Thought Content: Thought content normal.         Judgment: Judgment normal.       Laboratory  CMP:   Recent Labs   Lab 01/12/23  1229   Sodium Level 140   Potassium Level 4.6   Chloride 103   Carbon Dioxide 29   Blood Urea Nitrogen 13.0   Creatinine 1.13   Glucose Level 79   Calcium Level Total 9.5   Albumin Level 3.9   Bilirubin Total 0.4   Aspartate Aminotransferase 17   Alanine Aminotransferase 27   Alkaline Phosphatase 93     CBC:   Recent Labs   Lab 01/12/23  1229   WBC 10.3   Neut # 5.94   RBC 5.60   Hgb 17.1   Hct 51.0   Platelet 347   MCV 91.1   RDW 12.8     FLP:     DM:   Recent Labs   Lab 01/12/23  1229   Creatinine 1.13     Thyroid:       Invalid input(s): [id number]  Anemia:   Recent Labs   Lab 01/12/23  1229   Hgb 17.1   Hct 51.0         ASSESSMENT & PLAN:     Paco was seen today for establish care, cough, shortness of breath and medication refill.    Diagnoses and all orders for this visit:    Chronic frontal sinusitis  -     CT Sinuses without Contrast; Future    Mixed hyperlipidemia  -     atorvastatin (LIPITOR) 80 MG tablet; Take 1 tablet (80 mg total) by mouth every evening.    Coronary artery disease involving native coronary artery of native heart without angina pectoris  -     aspirin (ECOTRIN) 81 MG EC tablet; Take 1 tablet (81 mg total) by mouth once daily.  -     isosorbide mononitrate  (IMDUR) 30 MG 24 hr tablet; Take 1 tablet (30 mg total) by mouth once daily.  -     metoprolol tartrate (LOPRESSOR) 25 MG tablet; Take 1 tablet (25 mg total) by mouth once daily.    Other chest pain  -     isosorbide mononitrate (IMDUR) 30 MG 24 hr tablet; Take 1 tablet (30 mg total) by mouth once daily.    HTN (hypertension), benign  -     lisinopriL (PRINIVIL,ZESTRIL) 20 MG tablet; Take 1 tablet (20 mg total) by mouth once daily.  -     metoprolol tartrate (LOPRESSOR) 25 MG tablet; Take 1 tablet (25 mg total) by mouth once daily.    Asthma, unspecified asthma severity, unspecified whether complicated, unspecified whether persistent  -     albuterol (PROVENTIL/VENTOLIN HFA) 90 mcg/actuation inhaler; Inhale 2 puffs into the lungs every 4 (four) hours as needed for Shortness of Breath or Wheezing.    Smoker  -     buPROPion (WELLBUTRIN SR) 150 MG TBSR 12 hr tablet; Take 1 tablet (150 mg total) by mouth once daily.        Health Maintenance Due   Topic Date Due    Hepatitis C Screening  Never done    Lipid Panel  Never done    Pneumococcal Vaccines (Age 0-64) (1 - PCV) Never done    HIV Screening  Never done    Hemoglobin A1c (Diabetic Prevention Screening)  Never done    Shingles Vaccine (1 of 2) Never done    COVID-19 Vaccine (3 - Booster for Moderna series) 11/12/2021    Influenza Vaccine (1) Never done           Health Maintenance/ Wellness  Pneumococcal vaccine: Not UTD; defer to next visit  TDAP: 9/26/2019; UTD  Influenza vaccine: Not UTD; defer to next visit  Shingrix vaccine: Not UTD; defer to next visit  AAA U/S screening: Ordered and pending  Screening colonoscopy: Done 2/2/2023; Multiple polyps removed with pathology showing no evidence of malignancy. Recommend repeat in 3 years due to size of polyps.  Lung Cancer Screening: done 1/20/2023. Small lung nodules found. Recommend yearly surveillance.   Hepatitis Panel:Ordered and Pending  HIV - Ordered and pending    Future Appointments     Future Appointments    Date Time Provider Department Center   2/23/2023  6:00 PM Jane Partida, CTTS [facility] [place]   5/4/2023  1:15 PM RESIDENT 10, [facility] INTERNAL MEDICINE [facility] IM RES [place]   7/12/2023 10:15 AM LISSA ParkP [facility] CARD [place]      Orders Placed This Encounter   Procedures    CT Sinuses without Contrast     Standing Status:   Future     Standing Expiration Date:   2/9/2024     Order Specific Question:   May the Radiologist modify the order per protocol to meet the clinical needs of the patient?     Answer:   Yes     Order Specific Question:   [facility] ONLY: Performing/Resulting Location     Answer:   [facility]     Dispoistion: RTC in 3 months with labs. All medications refilled.    Marco Mueller MD  [facility] Internal Medicine, PGY-1

## 2023-02-10 ENCOUNTER — TELEPHONE (OUTPATIENT)
Dept: ENDOSCOPY | Facility: HOSPITAL | Age: 58
End: 2023-02-10

## 2023-02-10 NOTE — TELEPHONE ENCOUNTER
Patient called and notified that will need to schedule a flex sigmoidoscopy in 3 months per pathology results from colonoscopy. Patient verbalized understanding that he will be contacted at some point to have that scheduled.

## 2023-02-23 ENCOUNTER — TELEPHONE (OUTPATIENT)
Dept: SMOKING CESSATION | Facility: CLINIC | Age: 58
End: 2023-02-23

## 2023-02-24 NOTE — TELEPHONE ENCOUNTER
Pt had not shown up for his [facility] appt.  Called and spoke with pt.  Pt stated that he had forgotten about the appt.  Pt rescheduled to 3/22/23 at 6 pm.

## 2023-03-10 ENCOUNTER — HOSPITAL ENCOUNTER (OUTPATIENT)
Dept: RADIOLOGY | Facility: HOSPITAL | Age: 58
Discharge: HOME | End: 2023-03-10

## 2023-03-10 DIAGNOSIS — Z13.9 SCREENING DUE: ICD-10-CM

## 2023-03-10 PROCEDURE — 76706 US ABDL AORTA SCREEN AAA: CPT | Mod: TC

## 2023-03-22 ENCOUNTER — TELEPHONE (OUTPATIENT)
Dept: SMOKING CESSATION | Facility: CLINIC | Age: 58
End: 2023-03-22

## 2023-03-22 NOTE — TELEPHONE ENCOUNTER
Pt had not shown up for his [facility] appt.  Called and spoke with pt.  Pt stated that he was not feeling well and needs to reschedule.  Pt rescheduled to 4/19/23 at 6 pm.

## 2023-05-18 ENCOUNTER — OFFICE VISIT (OUTPATIENT)
Dept: CARDIOLOGY | Facility: CLINIC | Age: 58
End: 2023-05-18

## 2023-05-18 ENCOUNTER — TELEPHONE (OUTPATIENT)
Dept: ENDOSCOPY | Facility: HOSPITAL | Age: 58
End: 2023-05-18

## 2023-05-18 ENCOUNTER — TELEPHONE (OUTPATIENT)
Dept: SMOKING CESSATION | Facility: CLINIC | Age: 58
End: 2023-05-18

## 2023-05-18 VITALS
DIASTOLIC BLOOD PRESSURE: 80 MMHG | RESPIRATION RATE: 20 BRPM | WEIGHT: 216.81 LBS | SYSTOLIC BLOOD PRESSURE: 120 MMHG | OXYGEN SATURATION: 97 % | HEART RATE: 75 BPM | TEMPERATURE: 98 F | HEIGHT: 68 IN | BODY MASS INDEX: 32.86 KG/M2

## 2023-05-18 DIAGNOSIS — I20.9 ANGINA PECTORIS: ICD-10-CM

## 2023-05-18 DIAGNOSIS — R06.01 ORTHOPNEA: ICD-10-CM

## 2023-05-18 DIAGNOSIS — Z72.0 TOBACCO USER: ICD-10-CM

## 2023-05-18 DIAGNOSIS — E78.5 HYPERLIPIDEMIA, UNSPECIFIED HYPERLIPIDEMIA TYPE: ICD-10-CM

## 2023-05-18 DIAGNOSIS — I10 HTN (HYPERTENSION), BENIGN: ICD-10-CM

## 2023-05-18 DIAGNOSIS — I25.10 CORONARY ARTERY DISEASE INVOLVING NATIVE CORONARY ARTERY OF NATIVE HEART WITHOUT ANGINA PECTORIS: ICD-10-CM

## 2023-05-18 DIAGNOSIS — I25.10 ATHEROSCLEROSIS OF CORONARY ARTERY, UNSPECIFIED VESSEL OR LESION TYPE, UNSPECIFIED WHETHER ANGINA PRESENT, UNSPECIFIED WHETHER NATIVE OR TRANSPLANTED HEART: Primary | ICD-10-CM

## 2023-05-18 DIAGNOSIS — I10 HYPERTENSION, UNSPECIFIED TYPE: ICD-10-CM

## 2023-05-18 DIAGNOSIS — R07.89 OTHER CHEST PAIN: ICD-10-CM

## 2023-05-18 DIAGNOSIS — Z95.5 H/O HEART ARTERY STENT: ICD-10-CM

## 2023-05-18 DIAGNOSIS — R06.02 SOB (SHORTNESS OF BREATH): ICD-10-CM

## 2023-05-18 DIAGNOSIS — R06.00 PND (PAROXYSMAL NOCTURNAL DYSPNEA): ICD-10-CM

## 2023-05-18 PROCEDURE — 93005 ELECTROCARDIOGRAM TRACING: CPT

## 2023-05-18 PROCEDURE — 99214 OFFICE O/P EST MOD 30 MIN: CPT | Mod: PBBFAC | Performed by: STUDENT IN AN ORGANIZED HEALTH CARE EDUCATION/TRAINING PROGRAM

## 2023-05-18 RX ORDER — ISOSORBIDE MONONITRATE 30 MG/1
30 TABLET, EXTENDED RELEASE ORAL DAILY
Qty: 90 TABLET | Refills: 3 | Status: SHIPPED | OUTPATIENT
Start: 2023-05-18 | End: 2023-07-11 | Stop reason: SDUPTHER

## 2023-05-18 RX ORDER — LISINOPRIL 20 MG/1
20 TABLET ORAL DAILY
Qty: 90 TABLET | Refills: 3 | Status: SHIPPED | OUTPATIENT
Start: 2023-05-18 | End: 2023-06-22 | Stop reason: SDUPTHER

## 2023-05-18 RX ORDER — METOPROLOL TARTRATE 25 MG/1
25 TABLET, FILM COATED ORAL DAILY
Qty: 90 TABLET | Refills: 3 | Status: SHIPPED | OUTPATIENT
Start: 2023-05-18 | End: 2023-07-11 | Stop reason: SDUPTHER

## 2023-05-18 RX ORDER — CETIRIZINE HYDROCHLORIDE 10 MG/1
10 TABLET ORAL DAILY PRN
COMMUNITY
Start: 2023-03-22

## 2023-05-18 RX ORDER — NITROGLYCERIN 0.4 MG/1
0.4 TABLET SUBLINGUAL EVERY 5 MIN PRN
Qty: 25 TABLET | Refills: 3 | Status: SHIPPED | OUTPATIENT
Start: 2023-05-18 | End: 2024-05-17

## 2023-05-18 NOTE — PROGRESS NOTES
[facility]   Cardiology Clinic - Follow Up     Date of Visit: 5/18/2023  Reason for Visit/Chief Complaint:   Chief Complaint    f/u sts has been having chest pain and SOB all the time          I have explained to Mr. Paco Culp that I am not a cardiologist. He understands that I am an internal medicine physician seeing patients in the cardiology clinic. Mr. Paco Culp has expressed understanding of this fact and is willing to proceed with this visit.     History of Present Illness:      Paco Culp is a 57 y.o. male with a PMH significant for CAD/STEMI with stenting PCI/RCA in November 2019, PTCA/stenting Diagnal 1 (Sept. 2020) who presents to cardiology clinic for pre-operative eval. HE presents today with recent episode of crushing/squeezing chest pain that radiated into his left arm. Pain was associated with SOB. He reports the pain was similar to his previous MI but it eventually resolved. He continues to have significant SOB. He becomes SOB while playing with his 1 year old grandson. Also has orthopnea, edema and bendopnea.     Historical Information: He is a former patient of [facility], .  The patient completed a nuclear stress test on 8.19.22 due to symptoms of chest pain that was abnormal revealing a moderate to large-sized, fixed perfusion abnormality consistent with scar in the basal to distal inferoapical walls in the typical distribution of the RCA territory.( 8.19.22)  He completed PRASHANTH testing on 9.27.22 that revealed no evidence of significant arterial insufficiency.  Carotid ultrasound obtained on 11.4.22 demonstrated no hemodynamically significant stenosis to bilateral internal carotid arteries.     CP:  The patient has chest discomfort.      SOB:  The patient has shortness of breath. Has STERN    EDEMA:  The patient has edema.      ORTHOPNEA:  The patient has orthopnea.  No PND.      SYNCOPE:  The patient has near syncope.  No syncope.   No  dizziness.    PALPITATIONS:  The patient has no palpitations.    LEVEL OF EXERTION:  The patient  plays with grandson  and has symptoms with this level of exertion.  The patient's level of exertion is limited.    Past Medical History:        Past Medical History:   Diagnosis Date    Coronary artery disease     Hyperlipidemia     Hypertension     STEMI (ST elevation myocardial infarction) 2019       Surgical History:        Past Surgical History:   Procedure Laterality Date    COLONOSCOPY, WITH DIRECTED SUBMUCOSAL INJECTION  2/2/2023    Procedure: COLONOSCOPY, WITH DIRECTED SUBMUCOSAL INJECTION;  Surgeon: Grayson Demarco MD;  Location: [facility] ENDOSCOPY;  Service: Endoscopy;;  Tattoo    COLONOSCOPY, WITH POLYPECTOMY USING SNARE N/A 2/2/2023    Procedure: COLONOSCOPY, WITH POLYPECTOMY USING SNARE;  Surgeon: Grayson Demarco MD;  Location: [facility] ENDOSCOPY;  Service: Endoscopy;  Laterality: N/A;    coronary stents       EXCISIONAL HEMORRHOIDECTOMY         Family History:        Family History   Problem Relation Age of Onset    Heart disease Mother     Hyperlipidemia Mother     Hypertension Mother     Cancer Father        Social History:        Social History     Tobacco Use    Smoking status: Every Day     Packs/day: 2.00     Years: 30.00     Pack years: 60.00     Types: Cigarettes    Smokeless tobacco: Never    Tobacco comments:     Has an appt with smoke cessation   Substance Use Topics    Alcohol use: Not Currently    Drug use: Never       Allergies:       Review of patient's allergies indicates:  No Known Allergies    Medications:        Current Outpatient Medications   Medication Sig Dispense Refill    albuterol (PROVENTIL/VENTOLIN HFA) 90 mcg/actuation inhaler Inhale 2 puffs into the lungs every 4 (four) hours as needed for Shortness of Breath or Wheezing. 18 g 3    aspirin (ECOTRIN) 81 MG EC tablet Take 1 tablet (81 mg total) by mouth once daily. 90 tablet 3    atorvastatin (LIPITOR) 80 MG tablet Take 1 tablet  (80 mg total) by mouth every evening. 90 tablet 3    cetirizine (ZYRTEC) 10 MG tablet Take 10 mg by mouth daily as needed.      isosorbide mononitrate (IMDUR) 30 MG 24 hr tablet Take 1 tablet (30 mg total) by mouth once daily. 90 tablet 3    metoprolol tartrate (LOPRESSOR) 25 MG tablet Take 1 tablet (25 mg total) by mouth once daily. 90 tablet 3    nitroglycerin (NITROSTAT) 0.6 MG Subl Place 0.4 mg under the tongue every 5 (five) minutes as needed.      buPROPion (WELLBUTRIN SR) 150 MG TBSR 12 hr tablet Take 1 tablet (150 mg total) by mouth once daily. 90 tablet 0    lisinopriL (PRINIVIL,ZESTRIL) 20 MG tablet Take 1 tablet (20 mg total) by mouth once daily. (Patient not taking: Reported on 5/18/2023) 90 tablet 3    nicotine (NICODERM CQ) 21 mg/24 hr Place 1 patch onto the skin once daily.       No current facility-administered medications for this visit.       I have reviewed and updated the patient's medications, allergies, past medical history, surgical history, social history and family history as needed.    Review of Systems:      Review of Systems   Constitutional:  Negative for chills, diaphoresis and fever.   HENT:  Negative for hearing loss and nosebleeds.    Eyes:  Positive for blurred vision.   Respiratory:  Positive for shortness of breath.    Cardiovascular:  Positive for chest pain, orthopnea, leg swelling and PND. Negative for palpitations and claudication.   Gastrointestinal:  Positive for nausea. Negative for vomiting.   Genitourinary:  Negative for dysuria.   Musculoskeletal:  Negative for myalgias.   Skin:  Negative for rash.   Neurological:  Negative for dizziness and headaches.     Objective:        Vitals:    05/18/23 0818   BP: 120/80   Pulse: 75   Resp: 20   Temp: 97.7 °F (36.5 °C)     Wt Readings from Last 3 Encounters:   05/18/23 98.3 kg (216 lb 12.8 oz)   02/09/23 97.3 kg (214 lb 6.4 oz)   01/31/23 97.5 kg (215 lb)     Temp Readings from Last 3 Encounters:   05/18/23 97.7 °F (36.5 °C)  "(Oral)   02/09/23 98.3 °F (36.8 °C)   02/02/23 97.5 °F (36.4 °C) (Oral)     BP Readings from Last 3 Encounters:   05/18/23 120/80   02/09/23 113/68   02/02/23 (!) 107/56     Pulse Readings from Last 3 Encounters:   05/18/23 75   02/09/23 76   02/02/23 (!) 56       Vitals:    05/18/23 0818   BP: 120/80   BP Location: Left arm   Patient Position: Sitting   BP Method: Medium (Automatic)   Pulse: 75   Resp: 20   Temp: 97.7 °F (36.5 °C)   TempSrc: Oral   SpO2: 97%   Weight: 98.3 kg (216 lb 12.8 oz)   Height: 5' 8" (1.727 m)     Body mass index is 32.96 kg/m².    Physical Exam  Constitutional:       Appearance: Normal appearance.   HENT:      Head: Normocephalic and atraumatic.   Eyes:      Conjunctiva/sclera: Conjunctivae normal.   Cardiovascular:      Rate and Rhythm: Normal rate and regular rhythm.      Heart sounds: No murmur heard.    No friction rub. No gallop.   Pulmonary:      Effort: No respiratory distress.      Breath sounds: Normal breath sounds.   Abdominal:      General: Bowel sounds are normal. There is no distension.      Palpations: Abdomen is soft.   Musculoskeletal:      Right lower leg: No edema.      Left lower leg: No edema.   Skin:     General: Skin is warm and dry.      Findings: No rash.   Neurological:      Mental Status: He is alert. Mental status is at baseline.   Psychiatric:         Mood and Affect: Mood normal.         Thought Content: Thought content normal.         Judgment: Judgment normal.        Labs:      I have reviewed the following labs below:      CBC:  Lab Results   Component Value Date    WBC 10.3 01/12/2023    HGB 17.1 01/12/2023    HCT 51.0 01/12/2023     01/12/2023    MCV 91.1 01/12/2023    RDW 12.8 01/12/2023     BMP:  Lab Results   Component Value Date     01/12/2023    K 4.6 01/12/2023    CO2 29 01/12/2023    BUN 13.0 01/12/2023    CALCIUM 9.5 01/12/2023     LFTs:  Lab Results   Component Value Date    ALBUMIN 3.9 01/12/2023    BILITOT 0.4 01/12/2023    AST 17 " 01/12/2023    ALKPHOS 93 01/12/2023    ALT 27 01/12/2023     DM:  Lab Results   Component Value Date    CREATININE 1.13 01/12/2023     Cardiac Studies/Imaging:        I have reviewed the following studies below:      Stress Test  Results for orders placed during the hospital encounter of 08/19/22    Nuclear Stress - Cardiology Interpreted    Interpretation Summary    Abnormal myocardial perfusion scan.    There is a moderate to large sized, fixed perfusion abnormality consistent with scar in the basal to distal inferoapical wall(s) in the typical distribution of the RCA territory.    There are no other significant perfusion abnormalities.    The gated perfusion images showed an ejection fraction of 45% at rest. The gated perfusion images showed an ejection fraction of 48% post stress.    The EKG portion of this study is negative for ischemia.    The patient reported no chest pain during the stress test.    There were no arrhythmias during stress.    Scar present         TTE-09/08/2020  The study quality is average  The left ventricle is normal in size.  Global left ventricular systolic function is borderline normal.  The left ventricular ejection fraction is 50%.    The left ventricle diastolic function is impaired grade 1 with normal left atrial pressure  The left atrial diameter is mildly increased  Mild 1+ mitral regurgitation  Insufficient TR to estimate RVSP        Left heart catheterization 11/03/2019  Procedure summary  Acute inferior MI  PTCA/stent under proximal RCA 0%       Left heart catheterization 09/23/2020  PTA/stent Diagonal 1( ISR) 80- 90% to 10%.        Assessment & Plan:      57 y.o. male with the following medical problems:    CAD  - recent episode of crushing CP and SOB  - CAD/STEMI with stenting PCI/RCA in November 2019  - PTCA/RCA (Nov. 2019), PTCA/stenting Diagnal 1 (Sept. 2020)   - LVEF- 50% per TTE 9.28.20  - Abnormal nuclear stress test (8/19/2022)-  moderate to large sized, fixed  perfusion abnormality consistent with scar in the basal to distal inferoapical wall(s) in the typical distribution of the RCA territory. No reversible ischemia noted   - Continue ASA, lisinopril, metoprolol tartrate, imdur 30 mg and SL nitro prn CP  - Counseled patient on heart healthy, low-cholesterol diet, smoking cessation and activity as tolerated.  - given recent CP and SOB with new onset orthopnea, edema, PND, will obtain repeat stress nuclear   - patient is unable to do treadmill given severe SOB     Orthropnea/Bendopnea/SOB/PND  - recent onset  - will obtain Echo to evaluate EF     Hypertension- At goal   - /80  - Continue current regimen  - Counseled on low sodium diet     Hyperlipidemia  - no recent labs  - Continue atorvastatin 80 mg p.o. daily  - Counseled on low-cholesterol low-fat diet  andencouraged exercise as tolerated  - Patient to complete labs      Tobacco use  - Counseled on importance of smoking cessation    Return to clinic in 3 months.      No future appointments.      Chelsey Gong DO  Internal Medicine Physician   Department of Medicine   [facility]    05/18/2023 8:27 AM

## 2023-05-18 NOTE — TELEPHONE ENCOUNTER
"Based on 05/18/2023 cardiology office visit notes; patient is not cleared for scheduled 05/22/2023 colonoscopy due to new onset of chest pain, shortness of breath, orthopnea, edema, PND, new orders for repeat nuclear stress test and Echo to evaluate EF are noted.   Notified patient, patient states, "I forgot to stop by on your floor to tell you, I was on the phone with my mom". Colonoscopy will be postponed and rescheduled once patient cardiac status is stabled.  MS  "

## 2023-05-18 NOTE — TELEPHONE ENCOUNTER
Contacted pt regarding rescheduling his [facility] appt.  Pt stated that he does not have time and will call back when he is ready to quit.

## 2023-06-16 ENCOUNTER — HOSPITAL ENCOUNTER (OUTPATIENT)
Dept: RADIOLOGY | Facility: HOSPITAL | Age: 58
Discharge: HOME | End: 2023-06-16
Attending: STUDENT IN AN ORGANIZED HEALTH CARE EDUCATION/TRAINING PROGRAM

## 2023-06-16 ENCOUNTER — HOSPITAL ENCOUNTER (OUTPATIENT)
Dept: CARDIOLOGY | Facility: HOSPITAL | Age: 58
Discharge: HOME | End: 2023-06-16
Attending: STUDENT IN AN ORGANIZED HEALTH CARE EDUCATION/TRAINING PROGRAM

## 2023-06-16 VITALS
SYSTOLIC BLOOD PRESSURE: 130 MMHG | DIASTOLIC BLOOD PRESSURE: 85 MMHG | WEIGHT: 216 LBS | BODY MASS INDEX: 32.74 KG/M2 | HEIGHT: 68 IN

## 2023-06-16 VITALS
HEART RATE: 79 BPM | RESPIRATION RATE: 20 BRPM | WEIGHT: 216.69 LBS | BODY MASS INDEX: 32.84 KG/M2 | HEIGHT: 68 IN | DIASTOLIC BLOOD PRESSURE: 73 MMHG | SYSTOLIC BLOOD PRESSURE: 133 MMHG

## 2023-06-16 DIAGNOSIS — R06.01 ORTHOPNEA: ICD-10-CM

## 2023-06-16 DIAGNOSIS — I25.10 ATHEROSCLEROSIS OF CORONARY ARTERY, UNSPECIFIED VESSEL OR LESION TYPE, UNSPECIFIED WHETHER ANGINA PRESENT, UNSPECIFIED WHETHER NATIVE OR TRANSPLANTED HEART: ICD-10-CM

## 2023-06-16 DIAGNOSIS — R06.00 PND (PAROXYSMAL NOCTURNAL DYSPNEA): ICD-10-CM

## 2023-06-16 DIAGNOSIS — R06.02 SOB (SHORTNESS OF BREATH): ICD-10-CM

## 2023-06-16 DIAGNOSIS — Z95.5 H/O HEART ARTERY STENT: ICD-10-CM

## 2023-06-16 DIAGNOSIS — I20.9 ANGINA PECTORIS: ICD-10-CM

## 2023-06-16 LAB
AV INDEX (PROSTH): 0.53
AV MEAN GRADIENT: 5 MMHG
AV PEAK GRADIENT: 8 MMHG
AV VALVE AREA: 1.68 CM2
AV VELOCITY RATIO: 0.49
BSA FOR ECHO PROCEDURE: 2.17 M2
CV ECHO LV RWT: 0.51 CM
CV STRESS BASE HR: 79 BPM
DIASTOLIC BLOOD PRESSURE: 73 MMHG
DOP CALC AO PEAK VEL: 1.43 M/S
DOP CALC AO VTI: 24.5 CM
DOP CALC LVOT AREA: 3.2 CM2
DOP CALC LVOT DIAMETER: 2.01 CM
DOP CALC LVOT PEAK VEL: 0.7 M/S
DOP CALC LVOT STROKE VOLUME: 41.23 CM3
DOP CALC MV VTI: 17.4 CM
DOP CALCLVOT PEAK VEL VTI: 13 CM
E WAVE DECELERATION TIME: 223.76 MSEC
E/A RATIO: 0.73
ECHO LV POSTERIOR WALL: 1.26 CM (ref 0.6–1.1)
EJECTION FRACTION: 47 %
FRACTIONAL SHORTENING: 20 % (ref 28–44)
HR MV ECHO: 75 BPM
INTERVENTRICULAR SEPTUM: 1.23 CM (ref 0.6–1.1)
LEFT ATRIUM SIZE: 3.8 CM
LEFT ATRIUM VOLUME INDEX MOD: 12.4 ML/M2
LEFT ATRIUM VOLUME MOD: 26.17 CM3
LEFT INTERNAL DIMENSION IN SYSTOLE: 4 CM (ref 2.1–4)
LEFT VENTRICLE DIASTOLIC VOLUME INDEX: 55.14 ML/M2
LEFT VENTRICLE DIASTOLIC VOLUME: 116.34 ML
LEFT VENTRICLE MASS INDEX: 116 G/M2
LEFT VENTRICLE SYSTOLIC VOLUME INDEX: 33.2 ML/M2
LEFT VENTRICLE SYSTOLIC VOLUME: 70.1 ML
LEFT VENTRICULAR INTERNAL DIMENSION IN DIASTOLE: 4.97 CM (ref 3.5–6)
LEFT VENTRICULAR MASS: 243.87 G
LV LATERAL E/E' RATIO: 4.4 M/S
LVOT MG: 1.15 MMHG
LVOT MV: 0.51 CM/S
MV MEAN GRADIENT: 1 MMHG
MV PEAK A VEL: 0.6 M/S
MV PEAK E VEL: 0.44 M/S
MV PEAK GRADIENT: 2 MMHG
MV VALVE AREA BY CONTINUITY EQUATION: 2.37 CM2
NUC REST EJECTION FRACTION: 55
NUC STRESS EJECTION FRACTION: 48 %
OHS CV CPX 85 PERCENT MAX PREDICTED HEART RATE MALE: 140
OHS CV CPX ESTIMATED METS: 2
OHS CV CPX MAX PREDICTED HEART RATE: 165
OHS CV CPX PATIENT IS FEMALE: 0
OHS CV CPX PATIENT IS MALE: 1
OHS CV CPX PEAK DIASTOLIC BLOOD PRESSURE: 73 MMHG
OHS CV CPX PEAK HEAR RATE: 114 BPM
OHS CV CPX PEAK RATE PRESSURE PRODUCT: NORMAL
OHS CV CPX PEAK SYSTOLIC BLOOD PRESSURE: 133 MMHG
OHS CV CPX PERCENT MAX PREDICTED HEART RATE ACHIEVED: 69
OHS CV CPX RATE PRESSURE PRODUCT PRESENTING: NORMAL
OHS LV EJECTION FRACTION SIMPSONS BIPLANE MOD: 5 %
RA WIDTH: 4.5 CM
STRESS ECHO POST EXERCISE DUR MIN: 3 MINUTES
STRESS ECHO POST EXERCISE DUR SEC: 1 SECONDS
SYSTOLIC BLOOD PRESSURE: 133 MMHG
TDI LATERAL: 0.1 M/S
TRICUSPID ANNULAR PLANE SYSTOLIC EXCURSION: 1.73 CM

## 2023-06-16 PROCEDURE — 78452 HT MUSCLE IMAGE SPECT MULT: CPT

## 2023-06-16 PROCEDURE — A9502 TC99M TETROFOSMIN: HCPCS

## 2023-06-16 PROCEDURE — 93017 CV STRESS TEST TRACING ONLY: CPT

## 2023-06-16 PROCEDURE — 93306 TTE W/DOPPLER COMPLETE: CPT

## 2023-06-16 PROCEDURE — 63600175 PHARM REV CODE 636 W HCPCS

## 2023-06-16 RX ORDER — REGADENOSON 0.08 MG/ML
INJECTION, SOLUTION INTRAVENOUS
Status: COMPLETED
Start: 2023-06-16 | End: 2023-06-16

## 2023-06-16 RX ADMIN — REGADENOSON 0.4 MG: 0.08 INJECTION, SOLUTION INTRAVENOUS at 08:06

## 2023-06-22 ENCOUNTER — OFFICE VISIT (OUTPATIENT)
Dept: CARDIOLOGY | Facility: CLINIC | Age: 58
End: 2023-06-22

## 2023-06-22 VITALS
SYSTOLIC BLOOD PRESSURE: 125 MMHG | RESPIRATION RATE: 18 BRPM | HEART RATE: 97 BPM | WEIGHT: 219 LBS | OXYGEN SATURATION: 97 % | BODY MASS INDEX: 33.19 KG/M2 | DIASTOLIC BLOOD PRESSURE: 84 MMHG | TEMPERATURE: 98 F | HEIGHT: 68 IN

## 2023-06-22 DIAGNOSIS — I10 PRIMARY HYPERTENSION: Primary | ICD-10-CM

## 2023-06-22 DIAGNOSIS — Z95.5 H/O HEART ARTERY STENT: ICD-10-CM

## 2023-06-22 DIAGNOSIS — R94.39 ABNORMAL STRESS TEST: ICD-10-CM

## 2023-06-22 DIAGNOSIS — I10 HTN (HYPERTENSION), BENIGN: ICD-10-CM

## 2023-06-22 DIAGNOSIS — E78.2 MIXED HYPERLIPIDEMIA: ICD-10-CM

## 2023-06-22 DIAGNOSIS — I20.9 ANGINA PECTORIS: ICD-10-CM

## 2023-06-22 DIAGNOSIS — Z72.0 TOBACCO USER: ICD-10-CM

## 2023-06-22 DIAGNOSIS — I25.10 ATHEROSCLEROSIS OF NATIVE CORONARY ARTERY OF NATIVE HEART WITHOUT ANGINA PECTORIS: ICD-10-CM

## 2023-06-22 PROCEDURE — 99214 PR OFFICE/OUTPT VISIT, EST, LEVL IV, 30-39 MIN: ICD-10-PCS | Mod: S$PBB,,, | Performed by: NURSE PRACTITIONER

## 2023-06-22 PROCEDURE — 99214 OFFICE O/P EST MOD 30 MIN: CPT | Mod: PBBFAC | Performed by: NURSE PRACTITIONER

## 2023-06-22 PROCEDURE — 99214 OFFICE O/P EST MOD 30 MIN: CPT | Mod: S$PBB,,, | Performed by: NURSE PRACTITIONER

## 2023-06-22 RX ORDER — DIPHENHYDRAMINE HCL 25 MG
25 CAPSULE ORAL
Status: CANCELLED | OUTPATIENT
Start: 2023-06-22

## 2023-06-22 RX ORDER — SODIUM CHLORIDE 9 MG/ML
INJECTION, SOLUTION INTRAVENOUS ONCE
Status: CANCELLED | OUTPATIENT
Start: 2023-06-22 | End: 2023-06-22

## 2023-06-22 RX ORDER — DIAZEPAM 5 MG/1
5 TABLET ORAL
Status: ACTIVE | OUTPATIENT
Start: 2023-06-22

## 2023-06-22 RX ORDER — LISINOPRIL 10 MG/1
10 TABLET ORAL DAILY
Qty: 30 TABLET | Refills: 11 | Status: SHIPPED | OUTPATIENT
Start: 2023-06-22 | End: 2023-07-11 | Stop reason: SDUPTHER

## 2023-06-22 RX ORDER — RANOLAZINE 500 MG/1
500 TABLET, EXTENDED RELEASE ORAL 2 TIMES DAILY
Qty: 60 TABLET | Refills: 11 | Status: SHIPPED | OUTPATIENT
Start: 2023-06-22 | End: 2023-07-11 | Stop reason: SDUPTHER

## 2023-06-22 NOTE — LETTER
June 22, 2023      [facility]  [address] [address]  Phone: [phone]       Patient: Paco Culp   YOB: 1965  Date of Visit: 06/22/2023    To Whom It May Concern:    Yvette Culp  was at [facility] on 06/22/2023. The patient may return to work/school with out restrictions. Patient is scheduled for as pre-op visit 07/11/23 and left heart angiogram 07/17/23 and will be advised to not return to work until the following week; pending further notice.  If you have any questions or concerns, or if I can be of further assistance, please do not hesitate to contact me.    Sincerely,    Zaria Estrada LPN

## 2023-06-22 NOTE — H&P (VIEW-ONLY)
CHIEF COMPLAINT:   Chief Complaint   Patient presents with    F/U with stress, echo. Pt c/o chest pressure every other da                                                  HPI:  Paco Culp 57 y.o. male with PMH significant for CAD/STEMI with stenting PCI/RCA in November 2019, PTCA/stenting Diagnal 1 (Sept. 2020) who presents to cardiology clinic for routine follow up and and results of testing.  At his last clinic visit, the patient endorsed intermittent crushing/squeezing pain with radiation to his left arm and exertional dyspnea.  He completed a subsequent nuclear stress test on 6.16.23 that revealed a moderate to large sized, severe intensity, mostly fixed perfusion abnormality with some reversibility in the basal to apical inferoapical wall in the typical distribution of the RCA territory.  He completed an echocardiogram on 6.16.23 that revealed a mildly reduced ejection fraction of 47% and grade 1 diastolic dysfunction. (See reports below).    Today the patient continues to endorse intermittent episodes of left-sided squeezing chest pain with radiation to his left arm.  He reports the chest pain occurs at random, with or without exertion.  He endorses a episode of severe left-sided chest pain yesterday, after hearing about the passing of his close friend.  He reports that the chest pain eventually subsided after a few minutes.  Of note, the patient reports that he does not keep his nitroglycerin on him but will moving forward.  He also continues to endorse baseline shortness of breath with exertion.  He reports shortness of breath with minimal to moderate activity.  He denies any palpitations, PND or syncope.  Of note, the patient reports that he is not completely compliant with his medications. stating that he takes his metoprolol, Imdur and atorvastatin on a regular basis and his aspirin approximately 5 days.  He reports that he smokes approximately 2 packs of cigarettes a day and states he will quit when  ready.  Of note, the patient reports that he may be undergoing a colonoscopy and may require cardiac risk stratification.      Historical Information: He is a former patient of PO[facility], .  The patient completed a nuclear stress test on 8.19.22 due to symptoms of chest pain that was abnormal revealing a moderate to large-sized, fixed perfusion abnormality consistent with scar in the basal to distal inferoapical walls in the typical distribution of the RCA territory.( 8.19.22)  He completed PRASHANTH testing on 9.27.22 that revealed no evidence of significant arterial insufficiency.  Carotid ultrasound obtained on 11.4.22 demonstrated no hemodynamically significant stenosis to bilateral internal carotid arteries.                                                                                                                                                                                                                                                                                                                                                                                                                                                                                       CARDIAC TESTING:    Echo Saline Bubble? No 6.16.23    Interpretation Summary  · The left ventricle is normal in size with mildly decreased systolic function.  · Normal right ventricular size with normal right ventricular systolic function.  · The estimated ejection fraction is 47%.  · Grade I left ventricular diastolic dysfunction.  · Mild-to-moderate mitral regurgitation.      Nuclear Stress - Cardiology Interpreted 6.16.23    Interpretation Summary    Abnormal myocardial perfusion scan.    There is a severe intensity, moderate to large sized, mostly fixed perfusion abnormality with some reversibilty in the basal to apical inferoapical wall(s) in the typical distribution of the RCA territory.    There are no other significant perfusion  abnormalities.    The gated perfusion images showed an ejection fraction of 55% at rest. The gated perfusion images showed an ejection fraction of 48% post stress.    The patient reported no chest pain during the stress test.    There were no arrhythmias during stress.    On the nuclear stress test the EF is equivocal.  If the patient has an echo, the EF on the echo is considered more accurate.    The patient has a low to moderate risk nuclear stress test.    If patient is symptomatic, consider management with two anti-anginals     I have reviewed the following studies below:       Stress Test       Nuclear Stress - Cardiology Interpreted 8.19.22     Interpretation Summary    Abnormal myocardial perfusion scan.    There is a moderate to large sized, fixed perfusion abnormality consistent with scar in the basal to distal inferoapical wall(s) in the typical distribution of the RCA territory.    There are no other significant perfusion abnormalities.    The gated perfusion images showed an ejection fraction of 45% at rest. The gated perfusion images showed an ejection fraction of 48% post stress.    The EKG portion of this study is negative for ischemia.    The patient reported no chest pain during the stress test.    There were no arrhythmias during stress.     Scar present           TTE-09/08/2020  The study quality is average  The left ventricle is normal in size.  Global left ventricular systolic function is borderline normal.  The left ventricular ejection fraction is 50%.    The left ventricle diastolic function is impaired grade 1 with normal left atrial pressure  The left atrial diameter is mildly increased  Mild 1+ mitral regurgitation  Insufficient TR to estimate RVSP        Left heart catheterization 11/03/2019  Procedure summary  Acute inferior MI  PTCA/stent under proximal RCA 0%        Left heart catheterization 09/23/2020  PTA/stent Diagonal 1( ISR) 80- 90% to 10%.            Patient Active Problem List    Diagnosis    Atherosclerosis of coronary artery    Hypertension    Hyperlipidemia    Tobacco user    H/O heart artery stent    Angina pectoris    Obesity    Recurrent sinusitis    Hematochezia    Lower abdominal pain    Adenomatous polyp of descending colon    Adenomatous polyp of sigmoid colon    Abnormal stress test     Past Surgical History:   Procedure Laterality Date    COLONOSCOPY, WITH DIRECTED SUBMUCOSAL INJECTION  2/2/2023    Procedure: COLONOSCOPY, WITH DIRECTED SUBMUCOSAL INJECTION;  Surgeon: Grayson Demarco MD;  Location: [facility] ENDOSCOPY;  Service: Endoscopy;;  Tattoo    COLONOSCOPY, WITH POLYPECTOMY USING SNARE N/A 2/2/2023    Procedure: COLONOSCOPY, WITH POLYPECTOMY USING SNARE;  Surgeon: Grayson Demarco MD;  Location: [facility] ENDOSCOPY;  Service: Endoscopy;  Laterality: N/A;    coronary stents       EXCISIONAL HEMORRHOIDECTOMY       Social History     Socioeconomic History    Marital status:    Tobacco Use    Smoking status: Every Day     Packs/day: 2.00     Years: 30.00     Pack years: 60.00     Types: Cigarettes    Smokeless tobacco: Never    Tobacco comments:     Has an appt with smoke cessation   Substance and Sexual Activity    Alcohol use: Not Currently    Drug use: Never    Sexual activity: Not Currently     Partners: Female        Family History   Problem Relation Age of Onset    Heart disease Mother     Hyperlipidemia Mother     Hypertension Mother     Cancer Father      Review of patient's allergies indicates:  No Known Allergies      ROS:  Review of Systems   Constitutional: Negative.    HENT: Negative.     Eyes: Negative.    Respiratory:  Positive for shortness of breath.    Cardiovascular:  Positive for chest pain.   Gastrointestinal: Negative.    Genitourinary: Negative.    Musculoskeletal: Negative.    Skin: Negative.    Neurological: Negative.    Endo/Heme/Allergies: Negative.    Psychiatric/Behavioral: Negative.                                                                 "                                                                                                                Negative except as stated in the history of present illness. See HPI for details.    PHYSICAL EXAM:  Visit Vitals  /84 (BP Location: Right arm, BP Method: Medium (Automatic))   Pulse 97   Temp 98.1 °F (36.7 °C)   Resp 18   Ht 5' 7.72" (1.72 m)   Wt 99.3 kg (219 lb)   SpO2 97%   BMI 33.58 kg/m²       Physical Exam  Constitutional:       Appearance: Normal appearance.   HENT:      Head: Normocephalic.   Eyes:      Pupils: Pupils are equal, round, and reactive to light.   Cardiovascular:      Rate and Rhythm: Normal rate and regular rhythm.      Pulses: Normal pulses.   Pulmonary:      Effort: Pulmonary effort is normal.   Musculoskeletal:         General: Normal range of motion.   Skin:     General: Skin is warm and dry.   Neurological:      General: No focal deficit present.      Mental Status: He is alert and oriented to person, place, and time.   Psychiatric:         Mood and Affect: Mood normal.         Behavior: Behavior normal.     Current Outpatient Medications   Medication Instructions    albuterol (PROVENTIL/VENTOLIN HFA) 90 mcg/actuation inhaler 2 puffs, Inhalation, Every 4 hours PRN    aspirin (ECOTRIN) 81 mg, Oral, Daily    atorvastatin (LIPITOR) 80 mg, Oral, Nightly    buPROPion (WELLBUTRIN SR) 150 mg, Oral, Daily    cetirizine (ZYRTEC) 10 mg, Oral, Daily PRN    isosorbide mononitrate (IMDUR) 30 mg, Oral, Daily    lisinopriL 10 mg, Oral, Daily    metoprolol tartrate (LOPRESSOR) 25 mg, Oral, Daily    nicotine (NICODERM CQ) 21 mg/24 hr 1 patch, Transdermal, Daily    nitroGLYCERIN (NITROSTAT) 0.4 mg, Sublingual, Every 5 min PRN    ranolazine (RANEXA) 500 mg, Oral, 2 times daily        All medications, laboratory studies, cardiac diagnostic imaging reviewed.     Lab Results   Component Value Date    CREATININE 1.13 01/12/2023    K 4.6 01/12/2023        ASSESSMENT/PLAN:  Abnormal Nuclear Stress " Test   CAD  Chest pain  - Recent episode of severe crushing left-sided chest pain with associated shortness of breath.  Endorses ongoing shortness of breath with exertion  -  Nuclear Stress Test -severe intensity, moderate to large sized, mostly fixed perfusion abnormality with some reversibilty in the basal to apical inferoapical wall(s) in the typical distribution of the RCA territory. (6.16.23)  - CAD/STEMI with stenting PCI/RCA in November 2019  - PTCA/RCA (Nov. 2019), PTCA/stenting Diagnal 1 (Sept. 2020)   - LVEF- 50% per TTE 9.28.20  - Abnormal nuclear stress test (8/19/2022)-  moderate to large sized, fixed perfusion abnormality consistent with scar in the basal to distal inferoapical wall(s) in the typical distribution of the RCA territory. No reversible ischemia noted   - Continue ASA, lisinopril, metoprolol tartrate, imdur 30 mg and SL nitro prn CP.  Adding Ranexa 500 mg b.i.d. for optimization antianginals.  - Counseled patient on heart healthy, low-cholesterol diet, smoking cessation and activity as tolerated.  - Schedule left heart catheterization.  Patient endorses ischemic symptoms of chest pain and exertional dyspnea.  Has CAD/PCI with multiple risk factors of HTN, HLD, heavy tobacco use, obesity, family history of CAD  - Discussed risks/benefits and alternatives with the patient and they are agreeable to proceed  - Strict ED precautions provided                    STERN  Mildly reduced EF   - LVEF - 47% per ECHO 6.16.23  - Endorses stable STERN   - Continue BB, Refilling lisinopril 10 mg daily  - NV with perioperative visit  - Recommend PFTs, will defer to PCP      Hypertension  - BP at goal   - Continue current regimen  - Counseled on low sodium diet     Hyperlipidemia  - Continue atorvastatin 80 mg p.o. daily  - Counseled on low-cholesterol low-fat diet  andencouraged exercise as tolerated  - Patient to complete labs.  Will add FLP to preoperative labs     Tobacco use  - Reports smokes 2 packs of  cigarettes a day and reports will quit when ready  - Counseled on importance of smoking cessation    Add Ranexa 500 mg BID  Restart Lisinopril at 10 mg once daily  Add FLP to preop labs   Nurse visit with perioperative visit  Schedule left heart catheterization  Follow up in cardiology clinic postprocedure  Follow up with PCP as directed   Strict ED precautions

## 2023-06-22 NOTE — PATIENT INSTRUCTIONS
Add Ranexa 500 mg BID -  medication    Restart Lisinopril at 10 mg once daily -  medication    Add FLP to preop labs - will do at pre-op visit    Nurse visit with perioperative visit- will do at pre-op visit  Schedule left heart catheterization (LHC) - scheduoled 07/17/2023    Follow up in cardiology clinic postprocedure - will be scheduled after LHC    Follow up with PCP as directed     Strict ED precautions

## 2023-06-22 NOTE — PROGRESS NOTES
CHIEF COMPLAINT:   Chief Complaint   Patient presents with    F/U with stress, echo. Pt c/o chest pressure every other da                                                  HPI:  Paco Culp 57 y.o. male with PMH significant for CAD/STEMI with stenting PCI/RCA in November 2019, PTCA/stenting Diagnal 1 (Sept. 2020) who presents to cardiology clinic for routine follow up and and results of testing.  At his last clinic visit, the patient endorsed intermittent crushing/squeezing pain with radiation to his left arm and exertional dyspnea.  He completed a subsequent nuclear stress test on 6.16.23 that revealed a moderate to large sized, severe intensity, mostly fixed perfusion abnormality with some reversibility in the basal to apical inferoapical wall in the typical distribution of the RCA territory.  He completed an echocardiogram on 6.16.23 that revealed a mildly reduced ejection fraction of 47% and grade 1 diastolic dysfunction. (See reports below).    Today the patient continues to endorse intermittent episodes of left-sided squeezing chest pain with radiation to his left arm.  He reports the chest pain occurs at random, with or without exertion.  He endorses a episode of severe left-sided chest pain yesterday, after hearing about the passing of his close friend.  He reports that the chest pain eventually subsided after a few minutes.  Of note, the patient reports that he does not keep his nitroglycerin on him but will moving forward.  He also continues to endorse baseline shortness of breath with exertion.  He reports shortness of breath with minimal to moderate activity.  He denies any palpitations, PND or syncope.  Of note, the patient reports that he is not completely compliant with his medications. stating that he takes his metoprolol, Imdur and atorvastatin on a regular basis and his aspirin approximately 5 days.  He reports that he smokes approximately 2 packs of cigarettes a day and states he will quit when  ready.  Of note, the patient reports that he may be undergoing a colonoscopy and may require cardiac risk stratification.      Historical Information: He is a former patient of POLLY, .  The patient completed a nuclear stress test on 8.19.22 due to symptoms of chest pain that was abnormal revealing a moderate to large-sized, fixed perfusion abnormality consistent with scar in the basal to distal inferoapical walls in the typical distribution of the RCA territory.( 8.19.22)  He completed PRASHANTH testing on 9.27.22 that revealed no evidence of significant arterial insufficiency.  Carotid ultrasound obtained on 11.4.22 demonstrated no hemodynamically significant stenosis to bilateral internal carotid arteries.                                                                                                                                                                                                                                                                                                                                                                                                                                                                                       CARDIAC TESTING:    Echo Saline Bubble? No 6.16.23    Interpretation Summary  · The left ventricle is normal in size with mildly decreased systolic function.  · Normal right ventricular size with normal right ventricular systolic function.  · The estimated ejection fraction is 47%.  · Grade I left ventricular diastolic dysfunction.  · Mild-to-moderate mitral regurgitation.      Nuclear Stress - Cardiology Interpreted 6.16.23    Interpretation Summary    Abnormal myocardial perfusion scan.    There is a severe intensity, moderate to large sized, mostly fixed perfusion abnormality with some reversibilty in the basal to apical inferoapical wall(s) in the typical distribution of the RCA territory.    There are no other significant perfusion  abnormalities.    The gated perfusion images showed an ejection fraction of 55% at rest. The gated perfusion images showed an ejection fraction of 48% post stress.    The patient reported no chest pain during the stress test.    There were no arrhythmias during stress.    On the nuclear stress test the EF is equivocal.  If the patient has an echo, the EF on the echo is considered more accurate.    The patient has a low to moderate risk nuclear stress test.    If patient is symptomatic, consider management with two anti-anginals     I have reviewed the following studies below:       Stress Test       Nuclear Stress - Cardiology Interpreted 8.19.22     Interpretation Summary    Abnormal myocardial perfusion scan.    There is a moderate to large sized, fixed perfusion abnormality consistent with scar in the basal to distal inferoapical wall(s) in the typical distribution of the RCA territory.    There are no other significant perfusion abnormalities.    The gated perfusion images showed an ejection fraction of 45% at rest. The gated perfusion images showed an ejection fraction of 48% post stress.    The EKG portion of this study is negative for ischemia.    The patient reported no chest pain during the stress test.    There were no arrhythmias during stress.     Scar present           TTE-09/08/2020  The study quality is average  The left ventricle is normal in size.  Global left ventricular systolic function is borderline normal.  The left ventricular ejection fraction is 50%.    The left ventricle diastolic function is impaired grade 1 with normal left atrial pressure  The left atrial diameter is mildly increased  Mild 1+ mitral regurgitation  Insufficient TR to estimate RVSP        Left heart catheterization 11/03/2019  Procedure summary  Acute inferior MI  PTCA/stent under proximal RCA 0%        Left heart catheterization 09/23/2020  PTA/stent Diagonal 1( ISR) 80- 90% to 10%.            Patient Active Problem List    Diagnosis    Atherosclerosis of coronary artery    Hypertension    Hyperlipidemia    Tobacco user    H/O heart artery stent    Angina pectoris    Obesity    Recurrent sinusitis    Hematochezia    Lower abdominal pain    Adenomatous polyp of descending colon    Adenomatous polyp of sigmoid colon    Abnormal stress test     Past Surgical History:   Procedure Laterality Date    COLONOSCOPY, WITH DIRECTED SUBMUCOSAL INJECTION  2/2/2023    Procedure: COLONOSCOPY, WITH DIRECTED SUBMUCOSAL INJECTION;  Surgeon: Grayson Demarco MD;  Location: [facility] ENDOSCOPY;  Service: Endoscopy;;  Tattoo    COLONOSCOPY, WITH POLYPECTOMY USING SNARE N/A 2/2/2023    Procedure: COLONOSCOPY, WITH POLYPECTOMY USING SNARE;  Surgeon: Grayson Demarco MD;  Location: [facility] ENDOSCOPY;  Service: Endoscopy;  Laterality: N/A;    coronary stents       EXCISIONAL HEMORRHOIDECTOMY       Social History     Socioeconomic History    Marital status:    Tobacco Use    Smoking status: Every Day     Packs/day: 2.00     Years: 30.00     Pack years: 60.00     Types: Cigarettes    Smokeless tobacco: Never    Tobacco comments:     Has an appt with smoke cessation   Substance and Sexual Activity    Alcohol use: Not Currently    Drug use: Never    Sexual activity: Not Currently     Partners: Female        Family History   Problem Relation Age of Onset    Heart disease Mother     Hyperlipidemia Mother     Hypertension Mother     Cancer Father      Review of patient's allergies indicates:  No Known Allergies      ROS:  Review of Systems   Constitutional: Negative.    HENT: Negative.     Eyes: Negative.    Respiratory:  Positive for shortness of breath.    Cardiovascular:  Positive for chest pain.   Gastrointestinal: Negative.    Genitourinary: Negative.    Musculoskeletal: Negative.    Skin: Negative.    Neurological: Negative.    Endo/Heme/Allergies: Negative.    Psychiatric/Behavioral: Negative.                                                                 "                                                                                                                Negative except as stated in the history of present illness. See HPI for details.    PHYSICAL EXAM:  Visit Vitals  /84 (BP Location: Right arm, BP Method: Medium (Automatic))   Pulse 97   Temp 98.1 °F (36.7 °C)   Resp 18   Ht 5' 7.72" (1.72 m)   Wt 99.3 kg (219 lb)   SpO2 97%   BMI 33.58 kg/m²       Physical Exam  Constitutional:       Appearance: Normal appearance.   HENT:      Head: Normocephalic.   Eyes:      Pupils: Pupils are equal, round, and reactive to light.   Cardiovascular:      Rate and Rhythm: Normal rate and regular rhythm.      Pulses: Normal pulses.   Pulmonary:      Effort: Pulmonary effort is normal.   Musculoskeletal:         General: Normal range of motion.   Skin:     General: Skin is warm and dry.   Neurological:      General: No focal deficit present.      Mental Status: He is alert and oriented to person, place, and time.   Psychiatric:         Mood and Affect: Mood normal.         Behavior: Behavior normal.     Current Outpatient Medications   Medication Instructions    albuterol (PROVENTIL/VENTOLIN HFA) 90 mcg/actuation inhaler 2 puffs, Inhalation, Every 4 hours PRN    aspirin (ECOTRIN) 81 mg, Oral, Daily    atorvastatin (LIPITOR) 80 mg, Oral, Nightly    buPROPion (WELLBUTRIN SR) 150 mg, Oral, Daily    cetirizine (ZYRTEC) 10 mg, Oral, Daily PRN    isosorbide mononitrate (IMDUR) 30 mg, Oral, Daily    lisinopriL 10 mg, Oral, Daily    metoprolol tartrate (LOPRESSOR) 25 mg, Oral, Daily    nicotine (NICODERM CQ) 21 mg/24 hr 1 patch, Transdermal, Daily    nitroGLYCERIN (NITROSTAT) 0.4 mg, Sublingual, Every 5 min PRN    ranolazine (RANEXA) 500 mg, Oral, 2 times daily        All medications, laboratory studies, cardiac diagnostic imaging reviewed.     Lab Results   Component Value Date    CREATININE 1.13 01/12/2023    K 4.6 01/12/2023        ASSESSMENT/PLAN:  Abnormal Nuclear Stress " Test   CAD  Chest pain  - Recent episode of severe crushing left-sided chest pain with associated shortness of breath.  Endorses ongoing shortness of breath with exertion  -  Nuclear Stress Test -severe intensity, moderate to large sized, mostly fixed perfusion abnormality with some reversibilty in the basal to apical inferoapical wall(s) in the typical distribution of the RCA territory. (6.16.23)  - CAD/STEMI with stenting PCI/RCA in November 2019  - PTCA/RCA (Nov. 2019), PTCA/stenting Diagnal 1 (Sept. 2020)   - LVEF- 50% per TTE 9.28.20  - Abnormal nuclear stress test (8/19/2022)-  moderate to large sized, fixed perfusion abnormality consistent with scar in the basal to distal inferoapical wall(s) in the typical distribution of the RCA territory. No reversible ischemia noted   - Continue ASA, lisinopril, metoprolol tartrate, imdur 30 mg and SL nitro prn CP.  Adding Ranexa 500 mg b.i.d. for optimization antianginals.  - Counseled patient on heart healthy, low-cholesterol diet, smoking cessation and activity as tolerated.  - Schedule left heart catheterization.  Patient endorses ischemic symptoms of chest pain and exertional dyspnea.  Has CAD/PCI with multiple risk factors of HTN, HLD, heavy tobacco use, obesity, family history of CAD  - Discussed risks/benefits and alternatives with the patient and they are agreeable to proceed  - Strict ED precautions provided                    STERN  Mildly reduced EF   - LVEF - 47% per ECHO 6.16.23  - Endorses stable STERN   - Continue BB, Refilling lisinopril 10 mg daily  - NV with perioperative visit  - Recommend PFTs, will defer to PCP      Hypertension  - BP at goal   - Continue current regimen  - Counseled on low sodium diet     Hyperlipidemia  - Continue atorvastatin 80 mg p.o. daily  - Counseled on low-cholesterol low-fat diet  andencouraged exercise as tolerated  - Patient to complete labs.  Will add FLP to preoperative labs     Tobacco use  - Reports smokes 2 packs of  cigarettes a day and reports will quit when ready  - Counseled on importance of smoking cessation    Add Ranexa 500 mg BID  Restart Lisinopril at 10 mg once daily  Add FLP to preop labs   Nurse visit with perioperative visit  Schedule left heart catheterization  Follow up in cardiology clinic postprocedure  Follow up with PCP as directed   Strict ED precautions

## 2023-07-11 ENCOUNTER — CLINICAL SUPPORT (OUTPATIENT)
Dept: CARDIOLOGY | Facility: CLINIC | Age: 58
End: 2023-07-11

## 2023-07-11 VITALS
HEIGHT: 68 IN | WEIGHT: 216 LBS | SYSTOLIC BLOOD PRESSURE: 140 MMHG | OXYGEN SATURATION: 96 % | RESPIRATION RATE: 20 BRPM | DIASTOLIC BLOOD PRESSURE: 91 MMHG | BODY MASS INDEX: 32.74 KG/M2 | HEART RATE: 72 BPM

## 2023-07-11 DIAGNOSIS — I10 HTN (HYPERTENSION), BENIGN: ICD-10-CM

## 2023-07-11 DIAGNOSIS — I10 PRIMARY HYPERTENSION: ICD-10-CM

## 2023-07-11 DIAGNOSIS — R07.89 OTHER CHEST PAIN: ICD-10-CM

## 2023-07-11 DIAGNOSIS — I25.10 ATHEROSCLEROSIS OF NATIVE CORONARY ARTERY OF NATIVE HEART WITHOUT ANGINA PECTORIS: ICD-10-CM

## 2023-07-11 DIAGNOSIS — Z72.0 TOBACCO USER: ICD-10-CM

## 2023-07-11 DIAGNOSIS — Z95.5 H/O HEART ARTERY STENT: ICD-10-CM

## 2023-07-11 DIAGNOSIS — I25.10 CORONARY ARTERY DISEASE INVOLVING NATIVE CORONARY ARTERY OF NATIVE HEART WITHOUT ANGINA PECTORIS: ICD-10-CM

## 2023-07-11 DIAGNOSIS — R94.39 ABNORMAL STRESS TEST: ICD-10-CM

## 2023-07-11 DIAGNOSIS — I20.9 ANGINA PECTORIS: ICD-10-CM

## 2023-07-11 DIAGNOSIS — E78.2 MIXED HYPERLIPIDEMIA: ICD-10-CM

## 2023-07-11 LAB
ANION GAP SERPL CALC-SCNC: 8 MEQ/L
APTT PPP: 28.9 SECONDS
BASOPHILS # BLD AUTO: 0.04 X10(3)/MCL
BASOPHILS NFR BLD AUTO: 0.5 %
BUN SERPL-MCNC: 9.1 MG/DL (ref 8.4–25.7)
CALCIUM SERPL-MCNC: 9 MG/DL (ref 8.4–10.2)
CHLORIDE SERPL-SCNC: 104 MMOL/L (ref 98–107)
CHOLEST SERPL-MCNC: 158 MG/DL
CHOLEST/HDLC SERPL: 6 {RATIO} (ref 0–5)
CO2 SERPL-SCNC: 29 MMOL/L (ref 22–29)
CREAT SERPL-MCNC: 0.96 MG/DL (ref 0.73–1.18)
CREAT/UREA NIT SERPL: 9
EOSINOPHIL # BLD AUTO: 0.18 X10(3)/MCL (ref 0–0.9)
EOSINOPHIL NFR BLD AUTO: 2.3 %
ERYTHROCYTE [DISTWIDTH] IN BLOOD BY AUTOMATED COUNT: 12.8 % (ref 11.5–17)
GFR SERPLBLD CREATININE-BSD FMLA CKD-EPI: >60 MLS/MIN/1.73/M2
GLUCOSE SERPL-MCNC: 95 MG/DL (ref 74–100)
HCT VFR BLD AUTO: 52 % (ref 42–52)
HDLC SERPL-MCNC: 28 MG/DL (ref 35–60)
HGB BLD-MCNC: 16.8 G/DL (ref 14–18)
IMM GRANULOCYTES # BLD AUTO: 0.03 X10(3)/MCL (ref 0–0.04)
IMM GRANULOCYTES NFR BLD AUTO: 0.4 %
LDLC SERPL CALC-MCNC: 96 MG/DL (ref 50–140)
LYMPHOCYTES # BLD AUTO: 3.02 X10(3)/MCL (ref 0.6–4.6)
LYMPHOCYTES NFR BLD AUTO: 39.3 %
MCH RBC QN AUTO: 29.5 PG (ref 27–31)
MCHC RBC AUTO-ENTMCNC: 32.3 G/DL (ref 33–36)
MCV RBC AUTO: 91.4 FL (ref 80–94)
MONOCYTES # BLD AUTO: 0.66 X10(3)/MCL (ref 0.1–1.3)
MONOCYTES NFR BLD AUTO: 8.6 %
NEUTROPHILS # BLD AUTO: 3.76 X10(3)/MCL (ref 2.1–9.2)
NEUTROPHILS NFR BLD AUTO: 48.9 %
NRBC BLD AUTO-RTO: 0 %
PLATELET # BLD AUTO: 305 X10(3)/MCL (ref 130–400)
PMV BLD AUTO: 9.8 FL (ref 7.4–10.4)
POTASSIUM SERPL-SCNC: 4 MMOL/L (ref 3.5–5.1)
RBC # BLD AUTO: 5.69 X10(6)/MCL (ref 4.7–6.1)
SODIUM SERPL-SCNC: 141 MMOL/L (ref 136–145)
TRIGL SERPL-MCNC: 172 MG/DL (ref 34–140)
VLDLC SERPL CALC-MCNC: 34 MG/DL
WBC # SPEC AUTO: 7.69 X10(3)/MCL (ref 4.5–11.5)

## 2023-07-11 PROCEDURE — 85025 COMPLETE CBC W/AUTO DIFF WBC: CPT

## 2023-07-11 PROCEDURE — 85610 PROTHROMBIN TIME: CPT

## 2023-07-11 PROCEDURE — 36415 COLL VENOUS BLD VENIPUNCTURE: CPT

## 2023-07-11 PROCEDURE — 80048 BASIC METABOLIC PNL TOTAL CA: CPT

## 2023-07-11 PROCEDURE — 99213 OFFICE O/P EST LOW 20 MIN: CPT | Mod: PBBFAC

## 2023-07-11 PROCEDURE — 80061 LIPID PANEL: CPT

## 2023-07-11 PROCEDURE — 85730 THROMBOPLASTIN TIME PARTIAL: CPT

## 2023-07-11 RX ORDER — ATORVASTATIN CALCIUM 80 MG/1
80 TABLET, FILM COATED ORAL NIGHTLY
Qty: 90 TABLET | Refills: 3 | Status: SHIPPED | OUTPATIENT
Start: 2023-07-11 | End: 2024-07-10

## 2023-07-11 RX ORDER — ISOSORBIDE MONONITRATE 30 MG/1
30 TABLET, EXTENDED RELEASE ORAL DAILY
Qty: 90 TABLET | Refills: 3 | Status: SHIPPED | OUTPATIENT
Start: 2023-07-11 | End: 2024-07-10

## 2023-07-11 RX ORDER — METOPROLOL TARTRATE 25 MG/1
25 TABLET, FILM COATED ORAL DAILY
Qty: 90 TABLET | Refills: 3 | Status: SHIPPED | OUTPATIENT
Start: 2023-07-11 | End: 2024-07-10

## 2023-07-11 RX ORDER — RANOLAZINE 500 MG/1
500 TABLET, EXTENDED RELEASE ORAL 2 TIMES DAILY
Qty: 60 TABLET | Refills: 11 | Status: SHIPPED | OUTPATIENT
Start: 2023-07-11 | End: 2024-07-10

## 2023-07-11 RX ORDER — LISINOPRIL 10 MG/1
10 TABLET ORAL DAILY
Qty: 30 TABLET | Refills: 11 | Status: SHIPPED | OUTPATIENT
Start: 2023-07-11 | End: 2024-07-10

## 2023-07-11 NOTE — PROGRESS NOTES
Here for pre-op teaching.  Instructions discussed, questions answered, print outs given.       Patient would like a call back to discuss his stress test results. Please call back.

## 2023-07-17 ENCOUNTER — HOSPITAL ENCOUNTER (OUTPATIENT)
Facility: HOSPITAL | Age: 58
Discharge: HOME | End: 2023-07-17
Attending: INTERNAL MEDICINE | Admitting: INTERNAL MEDICINE

## 2023-07-17 VITALS
SYSTOLIC BLOOD PRESSURE: 117 MMHG | TEMPERATURE: 98 F | OXYGEN SATURATION: 99 % | RESPIRATION RATE: 16 BRPM | DIASTOLIC BLOOD PRESSURE: 74 MMHG | BODY MASS INDEX: 33.53 KG/M2 | HEIGHT: 67 IN | WEIGHT: 213.63 LBS | HEART RATE: 68 BPM

## 2023-07-17 DIAGNOSIS — I25.10 ATHEROSCLEROSIS OF NATIVE CORONARY ARTERY OF NATIVE HEART WITHOUT ANGINA PECTORIS: ICD-10-CM

## 2023-07-17 DIAGNOSIS — I20.9 ANGINA PECTORIS: ICD-10-CM

## 2023-07-17 DIAGNOSIS — I10 PRIMARY HYPERTENSION: ICD-10-CM

## 2023-07-17 DIAGNOSIS — I10 HTN (HYPERTENSION), BENIGN: ICD-10-CM

## 2023-07-17 DIAGNOSIS — E78.2 MIXED HYPERLIPIDEMIA: ICD-10-CM

## 2023-07-17 DIAGNOSIS — Z95.5 H/O HEART ARTERY STENT: ICD-10-CM

## 2023-07-17 DIAGNOSIS — R94.39 ABNORMAL STRESS TEST: ICD-10-CM

## 2023-07-17 DIAGNOSIS — Z72.0 TOBACCO USER: ICD-10-CM

## 2023-07-17 PROBLEM — I25.118 CORONARY ARTERY DISEASE OF NATIVE ARTERY WITH STABLE ANGINA PECTORIS: Status: ACTIVE | Noted: 2022-08-02

## 2023-07-17 PROCEDURE — C1887 CATHETER, GUIDING: HCPCS | Performed by: INTERNAL MEDICINE

## 2023-07-17 PROCEDURE — 99152 MOD SED SAME PHYS/QHP 5/>YRS: CPT | Performed by: INTERNAL MEDICINE

## 2023-07-17 PROCEDURE — 93454 CORONARY ARTERY ANGIO S&I: CPT | Performed by: INTERNAL MEDICINE

## 2023-07-17 PROCEDURE — C1894 INTRO/SHEATH, NON-LASER: HCPCS | Performed by: INTERNAL MEDICINE

## 2023-07-17 PROCEDURE — 25000242 PHARM REV CODE 250 ALT 637 W/ HCPCS: Performed by: INTERNAL MEDICINE

## 2023-07-17 PROCEDURE — C1769 GUIDE WIRE: HCPCS | Performed by: INTERNAL MEDICINE

## 2023-07-17 PROCEDURE — 25000003 PHARM REV CODE 250: Performed by: INTERNAL MEDICINE

## 2023-07-17 PROCEDURE — 63600175 PHARM REV CODE 636 W HCPCS: Performed by: INTERNAL MEDICINE

## 2023-07-17 PROCEDURE — 25500020 PHARM REV CODE 255: Performed by: INTERNAL MEDICINE

## 2023-07-17 PROCEDURE — 25000003 PHARM REV CODE 250: Performed by: NURSE PRACTITIONER

## 2023-07-17 PROCEDURE — 99153 MOD SED SAME PHYS/QHP EA: CPT | Performed by: INTERNAL MEDICINE

## 2023-07-17 RX ORDER — NITROGLYCERIN 20 MG/100ML
INJECTION INTRAVENOUS
Status: DISCONTINUED | OUTPATIENT
Start: 2023-07-17 | End: 2023-07-17 | Stop reason: HOSPADM

## 2023-07-17 RX ORDER — LIDOCAINE HYDROCHLORIDE 10 MG/ML
INJECTION, SOLUTION EPIDURAL; INFILTRATION; INTRACAUDAL; PERINEURAL
Status: DISCONTINUED | OUTPATIENT
Start: 2023-07-17 | End: 2023-07-17 | Stop reason: HOSPADM

## 2023-07-17 RX ORDER — ALBUTEROL SULFATE 0.83 MG/ML
2.5 SOLUTION RESPIRATORY (INHALATION) ONCE
Status: COMPLETED | OUTPATIENT
Start: 2023-07-17 | End: 2023-07-17

## 2023-07-17 RX ORDER — FENTANYL CITRATE 50 UG/ML
INJECTION, SOLUTION INTRAMUSCULAR; INTRAVENOUS
Status: DISCONTINUED | OUTPATIENT
Start: 2023-07-17 | End: 2023-07-17 | Stop reason: HOSPADM

## 2023-07-17 RX ORDER — HEPARIN SODIUM 5000 [USP'U]/ML
INJECTION, SOLUTION INTRAVENOUS; SUBCUTANEOUS
Status: DISCONTINUED | OUTPATIENT
Start: 2023-07-17 | End: 2023-07-17 | Stop reason: HOSPADM

## 2023-07-17 RX ORDER — ACETAMINOPHEN 325 MG/1
650 TABLET ORAL EVERY 4 HOURS PRN
Status: DISCONTINUED | OUTPATIENT
Start: 2023-07-17 | End: 2023-07-17 | Stop reason: HOSPADM

## 2023-07-17 RX ORDER — MIDAZOLAM HYDROCHLORIDE 1 MG/ML
INJECTION INTRAMUSCULAR; INTRAVENOUS
Status: DISCONTINUED | OUTPATIENT
Start: 2023-07-17 | End: 2023-07-17 | Stop reason: HOSPADM

## 2023-07-17 RX ORDER — DIPHENHYDRAMINE HCL 25 MG
25 CAPSULE ORAL
Status: DISCONTINUED | OUTPATIENT
Start: 2023-07-17 | End: 2023-07-17 | Stop reason: HOSPADM

## 2023-07-17 RX ORDER — SODIUM CHLORIDE 9 MG/ML
INJECTION, SOLUTION INTRAVENOUS ONCE
Status: COMPLETED | OUTPATIENT
Start: 2023-07-17 | End: 2023-07-17

## 2023-07-17 RX ORDER — DIAZEPAM 5 MG/1
5 TABLET ORAL
Status: DISCONTINUED | OUTPATIENT
Start: 2023-07-17 | End: 2023-07-17 | Stop reason: HOSPADM

## 2023-07-17 RX ORDER — ONDANSETRON 4 MG/1
8 TABLET, ORALLY DISINTEGRATING ORAL EVERY 8 HOURS PRN
Status: DISCONTINUED | OUTPATIENT
Start: 2023-07-17 | End: 2023-07-17 | Stop reason: HOSPADM

## 2023-07-17 RX ADMIN — DIAZEPAM 5 MG: 5 TABLET ORAL at 12:07

## 2023-07-17 RX ADMIN — ALBUTEROL SULFATE 2.5 MG: 2.5 SOLUTION RESPIRATORY (INHALATION) at 09:07

## 2023-07-17 RX ADMIN — SODIUM CHLORIDE: 9 INJECTION, SOLUTION INTRAVENOUS at 09:07

## 2023-07-17 RX ADMIN — DIPHENHYDRAMINE HYDROCHLORIDE 25 MG: 25 CAPSULE ORAL at 12:07

## 2023-07-17 NOTE — DISCHARGE SUMMARY
[facility] - Cath Lab  Discharge Note  Short Stay    Procedure(s) (LRB):  Angiogram, Coronary, with Left Heart Cath (Left)      OUTCOME: Patient tolerated treatment/procedure well without complication and is now ready for discharge.    DISPOSITION: Home or Self Care    FINAL DIAGNOSIS:  Coronary artery disease of native artery with stable angina pectoris  Mild CAD     57M PMHx CAD s/p SANDHYA (pRCA 2019, D1 2020) presents with progressive cardiac chest pain. ECG non-ischemic with no wall motion abnormalities noted on transthoracic echo with mildly reduced LVEF. Nuclear stress test with fixed basal-apical inferoapical wall infarct suggestive of defect RCA territory. Coronary angiogram performed with 30% in-stent restenosis noted in 1st diagonal branch, otherwise normal coronary arteries with mild luminal irregularities.    FOLLOWUP: In clinic    DISCHARGE INSTRUCTIONS:  No discharge procedures on file.     TIME SPENT ON DISCHARGE: 5 minutes    Brandon So M.D.  [facility] Cardiology Fellow PGY-4  Pager: [phone]

## 2023-07-17 NOTE — INTERVAL H&P NOTE
The patient has been examined and the H&P has been reviewed:    I concur with the findings and no changes have occurred since H&P was written.    Procedure risks, benefits and alternative options discussed and understood by patient/family.    Paco Culp is a 57 y.o. male with PMHx CAD s/p SANDHYA (pRCA 2019, D1 2020) presenting with progressive cardiac chest pain. ECG NSR, non-ischemic. TTE LVEF 47%, grade 1 diastolic dysfunction.  Lexiscan suggestive of moderate-sized fixed basal-apical and inferoapical wall defects suggestive of RCA territory.  Physical exam unchanged.      Active Hospital Problems    Diagnosis  POA    *CAD s/p SANDHYA (pRCA 2019, D1 2020)  [I25.118]  Yes    Abnormal stress test [R94.39]  Yes      Resolved Hospital Problems   No resolved problems to display.     Plan:  Proceed to coronary angiogram    Brandon So M.D.  [facility]iology Fellow PGY-4  Pager: [phone]

## 2023-07-17 NOTE — BRIEF OP NOTE
Angiogram, Coronary, with Left Heart Cath Brief Op Note  Patient Name: Paco Culp  MRN: [id number]  : 1965  Date of Procedure: 2023  Location of Procedure: [facility] CATH LAB    Procedure: Angiogram, Coronary, with Left Heart Cath    Pre-op Dx:   Abnormal stress test     Post-op Dx:   Mild CAD     Staff: Surgeon(s):  MD Brandon Hairston MD     Access:   Right  radial     Findings:   -LM:  no stenosis      -LAD:  no stenosis , 30% in-stent restenosis in 1st diagonal branch     -LCX:   no stenosis      -RCA:   mild distal luminal irregularities                 Implants: * No implants in log *     Complications:  No immediate complications            Disposition:  7E with plans to discharge           Condition:  stable     Impression:  Successful coronary angiogram     Plan:  Medical management       Brandon So M.D.  [facility] Cardiology Fellow PGY-4  Pager: [phone]   2023 1:53 PM

## 2023-07-17 NOTE — Clinical Note
The radial band was applied to the right radial artery. 14 cc's of air were inserted into the closure device. Able to move right fingers without complaints or difficulty; fingers warm to touch; cap refill less than 3 seconds

## 2023-07-17 NOTE — Clinical Note
55 ml of contrast were injected throughout the case. 10 mL of contrast was the total wasted during the case. 65 mL was the total amount used during the case.

## 2023-07-17 NOTE — DISCHARGE INSTRUCTIONS
Do not lift/push/pull anything over 5lbs for 5 days    Do not submerge Right arm in any water for 5 days - Showers only    Drink plenty of water over the next week - follow up with PCP    Remove blue arm board on Tuesday afternoon    Remove gauze dressing on Wednesday afternoon    Do not drive for 24 hours    If bleeding occurs, hold pressure on site, lay flat, and call 911!

## 2023-07-18 ENCOUNTER — TELEPHONE (OUTPATIENT)
Dept: CARDIOLOGY | Facility: CLINIC | Age: 58
End: 2023-07-18

## 2023-07-18 NOTE — PLAN OF CARE
[facility] Cardiology Plan of Care Note    Perioperative Cardiovascular Risk Assessment and Management for Noncardiac Procedure/Surgery  - Planned/Proposed Procedure/Surgery: sigmoidoscopy  - Timing of Procedure/Surgery: Elective  - Risk of Procedure/Surgery: Low  - METS: >/=4  - Active Cardiac Conditions: No current or active   - RCRI Score: 1  - RCRI Risk Factors: History of Ischemic Heart Disease  - Other CV Risk Factors: HTN and Tobacco Use  - The patient is low risk (<1% risk of MACE) for a Low risk procedure  -Coronary angiogram with mild, non-obstructive CAD on 7/17/2023.   - No further cardiac workup is indicated at this time prior to procedure.    Brandon So M.D.  [facility] Cardiology Fellow PGY-4  Pager: [phone]

## 2023-07-18 NOTE — TELEPHONE ENCOUNTER
Please see note from 7-17-23 by Cardio fellow, Dr. So concerning pt's risk stratification for sigmoidoscopy.     Thanks

## 2023-08-16 LAB
INR PPP: 1.2
PROTHROMBIN TIME: 15.1 SECONDS (ref 11.4–14)

## 2023-10-11 ENCOUNTER — TELEPHONE (OUTPATIENT)
Dept: GASTROENTEROLOGY | Facility: CLINIC | Age: 58
End: 2023-10-11

## 2023-10-11 NOTE — TELEPHONE ENCOUNTER
Called PT to r/s his GI procedure since being cleared by Cardio.  He asked to be rescheduled for next year due to personal reasons.  Offered him an apt this week or next week where we had cancellations.  PT rescheduled as he wished.

## 2025-02-04 ENCOUNTER — HOSPITAL ENCOUNTER (OUTPATIENT)
Dept: TELEMEDICINE | Facility: HOSPITAL | Age: 60
Discharge: HOME | End: 2025-02-04
Payer: MEDICAID

## 2025-02-04 PROCEDURE — 99285 EMERGENCY DEPT VISIT HI MDM: CPT | Mod: 95,,, | Performed by: PSYCHIATRY & NEUROLOGY

## 2025-02-04 NOTE — SUBJECTIVE & OBJECTIVE
HPI:  59 y.o. male Referring Facility: [facility] ED Referring Provider: RENATA PADRON JR. LKN:2/3/2025, 10:00 PM- Symptoms:trouble with sight, RS weakness      Images personally reviewed and interpreted:  Study: Head CT and CTA Head & Neck  Study Interpretation: no acute process     Assessment and plan:      CT head with no acute intra-cranial process. Patient is candidate of TNK given his symptoms consistent with acute ischemic stroke. Informed consent obtained from patient and his wife at bedside. They agree with treatment plan.   Recommend IV Tenecteplase 0.25mg/kg IV push (max dose 25mg); If Tenecteplase is not available use Alteplase 0.9mg/kg IV bolus followed by infusion (max dose 90mg)      Additional Recommendations:   1. Neurological assessment and vital signs (except temperature) every 15 minutes x 2 hours, then every 30 minutes x 6 hours, then every hour x 16 hours..  2. Frequency of BP assessments may need to be increased if systolic BP stays >= 180 mm Hg or diastolic BP stays >= 105 mm Hg. Administer antihypertensive meds as ordered  3. Temperature every 4 hours or as required.  4. Follow hospital protocol for further orders re: post thrombolytic therapy patient management.  5. No antithrombotics or anticoagulants (including but not limited to: heparin, warfarin, aspirin, clopidogrel, or dipyridamole) for 24 hours, then start antithrombotics as ordered by treating physician     I also recommend obtaining CTA head and neck with and without contrast. They will call me with CTA results if abnormal. If LVO, patient will be transferred for thrombectomy. Otherwise, below is plan.     - Admit to MICU for 24 hour monitoring s/p IV TNK   - Please use IV TPA order set  - Keep Strict NPO until patient passes bedside SS  - Head of bed flat, IV Fluids  - BP: Aggressive Treatment to keep blood pressure less than 180/105 mmHg, using: labetalol or nicardipine, avoid hydralazine/nitrates  -  Emergent CT Brain for: acute neurologic decline, nausea, vomiting, or new headache  - Repeat Imaging, ideally MRI Brain in 24 hours post treatment, but if unable to obtain MRI Brain, please get repeat non-con CT Brain  - LDL, HgA1C  - Neuro checks, vital checks  - Neuro consult if in house available or transfer for neuro consult  - Stroke/TIA work-up with MRI brain, Echo, PT/OT/ Speech and swallow evaluation.      Lytics recommendation: Recommend IV Tenecteplase 0.25mg/kg IV push (max dose 25mg); If Tenecteplase is not available use Alteplase 0.9mg/kg IV bolus followed by infusion (max dose 90mg)     BP goal prior to IV thrombolytics - <185/110 - Initiate BP management prior to treatment if not at goal    BP goal post IV thrombolytics - <180/105 for at least 24 hrs. - Continue BP management to maintain goal    Additional Recommendations:   Neurological assessment and vital signs (except temperature) every 15 minutes x 2 hours, then every 30 minutes x 6 hours, then every hour x 16 hours..  Frequency of BP assessments may need to be increased if systolic BP stays >= 180 mm Hg or diastolic BP stays >= 105 mm Hg. Administer antihypertensive meds as ordered  Temperature every 4 hours or as required.  Follow hospital protocol for further orders re: post thrombolytic therapy patient management.  No antithrombotics or anticoagulants (including but not limited to: heparin, warfarin, aspirin, clopidigrel, or dipyridamole) for 24 hours, then start antithrombotics as ordered by treating physician    Adapted from the American Heart Association/American Stroke Association (AHA/ASA) and American Association of Neuroscience Nurses (AANN) Guidelines.       Clinical Delays to Decision to Treat: None    Thrombectomy recommendation: Awaiting CTA results from [facility] for determination   Placement recommendation: pending further studies  transfer to nearest appropriate facility

## 2025-02-04 NOTE — TELEMEDICINE CONSULT
[facility]  Vascular Neurology  Comprehensive Stroke Center  TeleVascular Neurology Acute Consultation Note        Consult Information  Consults    Consulting Provider: RENATA PADRON JR.   Current Providers  No providers found    Patient Location: [facility] ED [facility] Emergency Department    [facility] hospital nurse at bedside with patient assisting consultant.  Patient information was obtained from patient.       Stroke Documentation  Acute Stroke Times   Last Known Normal Date: 02/03/25  Last Known Normal Time: 2200  Stroke Team Called Date: 02/04/25  Stroke Team Called Time: 0110  Stroke Team Arrival Date: 02/04/25  Stroke Team Arrival Time: 0110  Thrombolytic Therapy Recommended: Yes    NIH Scale:  1a. Level of Consciousness: 0-->Alert, keenly responsive  1b. LOC Questions: 0-->Answers both questions correctly  1c. LOC Commands: 0-->Performs both tasks correctly  2. Best Gaze: 0-->Normal  3. Visual: 1-->Partial hemianopia  4. Facial Palsy: 0-->Normal symmetrical movements  5a. Motor Arm, Left: 0-->No drift, limb holds 90 (or 45) degrees for full 10 secs  5b. Motor Arm, Right: 0-->No drift, limb holds 90 (or 45) degrees for full 10 secs  6a. Motor Leg, Left: 0-->No drift, leg holds 30 degree position for full 5 secs  6b. Motor Leg, Right: 1-->Drift, leg falls by the end of the 5-sec period but does not hit bed  7. Limb Ataxia: 0-->Absent  8. Sensory: 1-->Mild-to-moderate sensory loss, patient feels pinprick is less sharp or is dull on the affected side, or there is a loss of superficial pain with pinprick, but patient is aware of being touched  9. Best Language: 0-->No aphasia, normal  10. Dysarthria: 0-->Normal  11. Extinction and Inattention (formerly Neglect): 0-->No abnormality  Total (NIH Stroke Scale): 3      Modified [place]:    [place] Coma Scale:     ABCD2 Score:    [id number] Score:    HAS -BLED Score:    ICH Score:    Hunt & Beard  Classification:      There were no vitals taken for this visit.      In my opinion, this was a: Tier 1; VAN Stroke Assessment: Positive     Medical Decision Making  HPI:  59 y.o. male Referring Facility: [facility] ED Referring Provider: RENATA PADRON JR. LKN:2/3/2025, 10:00 PM- Symptoms:trouble with sight, RS weakness      Images personally reviewed and interpreted:  Study: Head CT and CTA Head & Neck  Study Interpretation: no acute process     Assessment and plan:      CT head with no acute intra-cranial process. Patient is candidate of TNK given his symptoms consistent with acute ischemic stroke. Informed consent obtained from patient and his wife at bedside. They agree with treatment plan.   Recommend IV Tenecteplase 0.25mg/kg IV push (max dose 25mg); If Tenecteplase is not available use Alteplase 0.9mg/kg IV bolus followed by infusion (max dose 90mg)      Additional Recommendations:   1. Neurological assessment and vital signs (except temperature) every 15 minutes x 2 hours, then every 30 minutes x 6 hours, then every hour x 16 hours..  2. Frequency of BP assessments may need to be increased if systolic BP stays >= 180 mm Hg or diastolic BP stays >= 105 mm Hg. Administer antihypertensive meds as ordered  3. Temperature every 4 hours or as required.  4. Follow hospital protocol for further orders re: post thrombolytic therapy patient management.  5. No antithrombotics or anticoagulants (including but not limited to: heparin, warfarin, aspirin, clopidogrel, or dipyridamole) for 24 hours, then start antithrombotics as ordered by treating physician     I also recommend obtaining CTA head and neck with and without contrast. They will call me with CTA results if abnormal. If LVO, patient will be transferred for thrombectomy. Otherwise, below is plan.     - Admit to MICU for 24 hour monitoring s/p IV TNK   - Please use IV TPA order set  - Keep Strict NPO until patient passes bedside SS  - Head  of bed flat, IV Fluids  - BP: Aggressive Treatment to keep blood pressure less than 180/105 mmHg, using: labetalol or nicardipine, avoid hydralazine/nitrates  - Emergent CT Brain for: acute neurologic decline, nausea, vomiting, or new headache  - Repeat Imaging, ideally MRI Brain in 24 hours post treatment, but if unable to obtain MRI Brain, please get repeat non-con CT Brain  - LDL, HgA1C  - Neuro checks, vital checks  - Neuro consult if in house available or transfer for neuro consult  - Stroke/TIA work-up with MRI brain, Echo, PT/OT/ Speech and swallow evaluation.      Lytics recommendation: Recommend IV Tenecteplase 0.25mg/kg IV push (max dose 25mg); If Tenecteplase is not available use Alteplase 0.9mg/kg IV bolus followed by infusion (max dose 90mg)     BP goal prior to IV thrombolytics - <185/110 - Initiate BP management prior to treatment if not at goal    BP goal post IV thrombolytics - <180/105 for at least 24 hrs. - Continue BP management to maintain goal    Additional Recommendations:   Neurological assessment and vital signs (except temperature) every 15 minutes x 2 hours, then every 30 minutes x 6 hours, then every hour x 16 hours..  Frequency of BP assessments may need to be increased if systolic BP stays >= 180 mm Hg or diastolic BP stays >= 105 mm Hg. Administer antihypertensive meds as ordered  Temperature every 4 hours or as required.  Follow hospital protocol for further orders re: post thrombolytic therapy patient management.  No antithrombotics or anticoagulants (including but not limited to: heparin, warfarin, aspirin, clopidigrel, or dipyridamole) for 24 hours, then start antithrombotics as ordered by treating physician    Adapted from the American Heart Association/American Stroke Association (AHA/ASA) and American Association of Neuroscience Nurses (AANN) Guidelines.       Clinical Delays to Decision to Treat: None    Thrombectomy recommendation: Awaiting CTA results from Sp[facility] for  determination   Placement recommendation: pending further studies  transfer to nearest appropriate facility                ROS  Physical Exam  Past Medical History:   Diagnosis Date    Coronary artery disease     Hyperlipidemia     Hypertension     STEMI (ST elevation myocardial infarction) 2019     Past Surgical History:   Procedure Laterality Date    ANGIOGRAM, CORONARY, WITH LEFT HEART CATHETERIZATION Left 7/17/2023    Procedure: Angiogram, Coronary, with Left Heart Cath;  Surgeon: Jim Herzog MD;  Location: [facility] CATH LAB;  Service: Cardiology;  Laterality: Left;    COLONOSCOPY, WITH DIRECTED SUBMUCOSAL INJECTION  2/2/2023    Procedure: COLONOSCOPY, WITH DIRECTED SUBMUCOSAL INJECTION;  Surgeon: Grayson Demarco MD;  Location: [facility] ENDOSCOPY;  Service: Endoscopy;;  Tattoo    COLONOSCOPY, WITH POLYPECTOMY USING SNARE N/A 2/2/2023    Procedure: COLONOSCOPY, WITH POLYPECTOMY USING SNARE;  Surgeon: Grayson Demarco MD;  Location: [facility] ENDOSCOPY;  Service: Endoscopy;  Laterality: N/A;    coronary stents       EXCISIONAL HEMORRHOIDECTOMY       Family History   Problem Relation Name Age of Onset    Cancer Mother      Heart disease Mother      Hyperlipidemia Mother      Hypertension Mother      Cancer Father         Diagnoses  Stroke like symptoms    Marie Leyva MD      Emergent/Acute neurological consultation requested by spoke provider due to critical concerns for possible cerebrovascular event that could result in permanent loss of neurologic/bodily function, severe disability or death of this patient.  Immediate/timely evaluation by a highly prepared expert is paramount for optimal outcomes  High risk for neurological deterioration if not properly diagnosed  High risk for neurological deterioration if not treated promplty/as soon as possible  Complex diagnostic evaluation may be required (advanced imaging)  High risk treatment options (thrombolytics and/or thrombectomy)    Patient care was coordinated  with spoke provider, including but not limted to    Discussing likely diagnosis/etiology of symptoms  Making recommendations for further diagnostic studies  Making recommendations for intravenous thrombolytics or other advanced therapies  Making recommendations for disposition (admission/transfer for higher level of care)      Neurology consultation requested by spoke provider. Audiovisual encounter with the patient performed using a secure connection.  Results and impressions from the visit are documented on this note and were communicated to the consulting provider/team via direct communication. The note has been shared for addition to the patients electronic medical record.

## 2025-02-11 DIAGNOSIS — C34.92 NON-SMALL CELL CARCINOMA OF LEFT LUNG: Primary | ICD-10-CM

## 2025-02-18 ENCOUNTER — HOSPITAL ENCOUNTER (EMERGENCY)
Facility: HOSPITAL | Age: 60
Discharge: HOME | End: 2025-02-18
Attending: INTERNAL MEDICINE
Payer: MEDICAID

## 2025-02-18 VITALS
TEMPERATURE: 98 F | OXYGEN SATURATION: 92 % | WEIGHT: 217.81 LBS | SYSTOLIC BLOOD PRESSURE: 107 MMHG | DIASTOLIC BLOOD PRESSURE: 83 MMHG | RESPIRATION RATE: 19 BRPM | HEART RATE: 75 BPM | BODY MASS INDEX: 34.19 KG/M2 | HEIGHT: 67 IN

## 2025-02-18 DIAGNOSIS — F41.9 ANXIETY: ICD-10-CM

## 2025-02-18 DIAGNOSIS — R51.9 ACUTE NONINTRACTABLE HEADACHE, UNSPECIFIED HEADACHE TYPE: Primary | ICD-10-CM

## 2025-02-18 LAB
ALBUMIN SERPL-MCNC: 3.8 G/DL (ref 3.5–5)
ALBUMIN/GLOB SERPL: 1.5 RATIO (ref 1.1–2)
ALP SERPL-CCNC: 81 UNIT/L (ref 40–150)
ALT SERPL-CCNC: 44 UNIT/L (ref 0–55)
ANION GAP SERPL CALC-SCNC: 6 MEQ/L
AST SERPL-CCNC: 20 UNIT/L (ref 5–34)
BASOPHILS # BLD AUTO: 0.05 X10(3)/MCL
BASOPHILS NFR BLD AUTO: 0.8 %
BILIRUB SERPL-MCNC: 0.5 MG/DL
BUN SERPL-MCNC: 11.3 MG/DL (ref 8.4–25.7)
CALCIUM SERPL-MCNC: 9.2 MG/DL (ref 8.4–10.2)
CHLORIDE SERPL-SCNC: 107 MMOL/L (ref 98–107)
CO2 SERPL-SCNC: 26 MMOL/L (ref 22–29)
CREAT SERPL-MCNC: 1.08 MG/DL (ref 0.72–1.25)
CREAT/UREA NIT SERPL: 10
EOSINOPHIL # BLD AUTO: 0.33 X10(3)/MCL (ref 0–0.9)
EOSINOPHIL NFR BLD AUTO: 5.2 %
ERYTHROCYTE [DISTWIDTH] IN BLOOD BY AUTOMATED COUNT: 13 % (ref 11.5–17)
GFR SERPLBLD CREATININE-BSD FMLA CKD-EPI: >60 ML/MIN/1.73/M2
GLOBULIN SER-MCNC: 2.6 GM/DL (ref 2.4–3.5)
GLUCOSE SERPL-MCNC: 110 MG/DL (ref 74–100)
HCT VFR BLD AUTO: 46.9 % (ref 42–52)
HGB BLD-MCNC: 15.9 G/DL (ref 14–18)
IMM GRANULOCYTES # BLD AUTO: 0.03 X10(3)/MCL (ref 0–0.04)
IMM GRANULOCYTES NFR BLD AUTO: 0.5 %
LYMPHOCYTES # BLD AUTO: 1.5 X10(3)/MCL (ref 0.6–4.6)
LYMPHOCYTES NFR BLD AUTO: 23.6 %
MAGNESIUM SERPL-MCNC: 2.1 MG/DL (ref 1.6–2.6)
MCH RBC QN AUTO: 30.9 PG (ref 27–31)
MCHC RBC AUTO-ENTMCNC: 33.9 G/DL (ref 33–36)
MCV RBC AUTO: 91.1 FL (ref 80–94)
MONOCYTES # BLD AUTO: 0.67 X10(3)/MCL (ref 0.1–1.3)
MONOCYTES NFR BLD AUTO: 10.5 %
NEUTROPHILS # BLD AUTO: 3.78 X10(3)/MCL (ref 2.1–9.2)
NEUTROPHILS NFR BLD AUTO: 59.4 %
NRBC BLD AUTO-RTO: 0 %
OHS QRS DURATION: 92 MS
OHS QTC CALCULATION: 415 MS
PLATELET # BLD AUTO: 272 X10(3)/MCL (ref 130–400)
PMV BLD AUTO: 8.9 FL (ref 7.4–10.4)
POTASSIUM SERPL-SCNC: 4 MMOL/L (ref 3.5–5.1)
PROT SERPL-MCNC: 6.4 GM/DL (ref 6.4–8.3)
RBC # BLD AUTO: 5.15 X10(6)/MCL (ref 4.7–6.1)
SODIUM SERPL-SCNC: 139 MMOL/L (ref 136–145)
TROPONIN I SERPL-MCNC: 0.03 NG/ML (ref 0–0.04)
WBC # BLD AUTO: 6.36 X10(3)/MCL (ref 4.5–11.5)

## 2025-02-18 PROCEDURE — 99285 EMERGENCY DEPT VISIT HI MDM: CPT | Mod: 25

## 2025-02-18 PROCEDURE — 80053 COMPREHEN METABOLIC PANEL: CPT | Performed by: INTERNAL MEDICINE

## 2025-02-18 PROCEDURE — 84484 ASSAY OF TROPONIN QUANT: CPT | Performed by: INTERNAL MEDICINE

## 2025-02-18 PROCEDURE — 93005 ELECTROCARDIOGRAM TRACING: CPT

## 2025-02-18 PROCEDURE — 93010 ELECTROCARDIOGRAM REPORT: CPT | Mod: ,,, | Performed by: INTERNAL MEDICINE

## 2025-02-18 PROCEDURE — 25000003 PHARM REV CODE 250: Performed by: INTERNAL MEDICINE

## 2025-02-18 PROCEDURE — 83735 ASSAY OF MAGNESIUM: CPT | Performed by: INTERNAL MEDICINE

## 2025-02-18 PROCEDURE — 85025 COMPLETE CBC W/AUTO DIFF WBC: CPT | Performed by: INTERNAL MEDICINE

## 2025-02-18 RX ORDER — ALPRAZOLAM 0.5 MG/1
0.5 TABLET ORAL
Status: COMPLETED | OUTPATIENT
Start: 2025-02-18 | End: 2025-02-18

## 2025-02-18 RX ADMIN — ALPRAZOLAM 0.5 MG: 0.5 TABLET ORAL at 06:02

## 2025-02-18 NOTE — ED PROVIDER NOTES
"Encounter Date: 2/18/2025    SCRIBE #1 NOTE: I, Aidan Tabor, am scribing for, and in the presence of,  Zay Peterson DO. I have scribed the following portions of the note - Other sections scribed: HPI, ROS, PE.       History     Chief Complaint   Patient presents with    Panic Attack     Pt arrives after having a headache around an hour ago felt like he had a pop in his head, pt had a recent stroke. Pt states he is on eliquis also and received either tpa or tnkase when he had the stroke recently. Pt states the pop made him have a panic attack. NIH 0     Patient is a 59 y.o. with a PMHx of CAD, anxiety, HLD, HTN, STEMI, cancer, and CVA presenting to the ED with a complaint of a headache that onset at 0400. Patient reports he was diagnosed with a stroke a week ago and was treated with a clot busting medication at [facility]. He states he still has normal strength and vision, but he lost some of his brain's processing speed. Patient reports his anxiety medicine (xanax) wore off this morning and he woke up to a "pop" in his head and a pressure headache. Patient describes his symptoms as "feeling like he is underwater." Patient also endorses some slight nausea with his headache. He took 2 extra strength tylenol and reports feeling slightly better now. He denies any current weakness, changes in vision, SOB, or neck pain. Patient is on eliquis and xanax 2x a day.     The history is provided by the patient. No  was used.     Review of patient's allergies indicates:  No Known Allergies  Past Medical History:   Diagnosis Date    Coronary artery disease     Hyperlipidemia     Hypertension     STEMI (ST elevation myocardial infarction) 2019     Past Surgical History:   Procedure Laterality Date    ANGIOGRAM, CORONARY, WITH LEFT HEART CATHETERIZATION Left 7/17/2023    Procedure: Angiogram, Coronary, with Left Heart Cath;  Surgeon: Jim Herzog MD;  Location: [facility] CATH LAB;  Service: " Cardiology;  Laterality: Left;    COLONOSCOPY, WITH DIRECTED SUBMUCOSAL INJECTION  2/2/2023    Procedure: COLONOSCOPY, WITH DIRECTED SUBMUCOSAL INJECTION;  Surgeon: Grayson Demarco MD;  Location: [facility] ENDOSCOPY;  Service: Endoscopy;;  Tattoo    COLONOSCOPY, WITH POLYPECTOMY USING SNARE N/A 2/2/2023    Procedure: COLONOSCOPY, WITH POLYPECTOMY USING SNARE;  Surgeon: Grayson Demarco MD;  Location: [facility] ENDOSCOPY;  Service: Endoscopy;  Laterality: N/A;    coronary stents       EXCISIONAL HEMORRHOIDECTOMY       Family History   Problem Relation Name Age of Onset    Cancer Mother      Heart disease Mother      Hyperlipidemia Mother      Hypertension Mother      Cancer Father       Social History[1]  Review of Systems   Eyes:  Negative for visual disturbance.   Respiratory:  Negative for shortness of breath.    Gastrointestinal:  Positive for nausea.   Musculoskeletal:  Negative for neck pain.   Neurological:  Positive for headaches. Negative for weakness.   Psychiatric/Behavioral:  The patient is nervous/anxious.        Physical Exam     Initial Vitals [02/18/25 0550]   BP Pulse Resp Temp SpO2   122/80 93 20 97.6 °F (36.4 °C) 96 %      MAP       --         Physical Exam    Constitutional: He appears well-developed and well-nourished. He appears ill (chronic). No distress.   HENT:   Head: Normocephalic and atraumatic. Mouth/Throat: Oropharynx is clear and moist.   Eyes: Conjunctivae and EOM are normal. Pupils are equal, round, and reactive to light.   Neck: Neck supple.   Cardiovascular:  Normal rate, regular rhythm and normal pulses.           Pulmonary/Chest: Effort normal and breath sounds normal.   Musculoskeletal:      Cervical back: Neck supple.     Neurological: He is alert and oriented to person, place, and time. He has normal strength. No cranial nerve deficit or sensory deficit.   No focal neuro deficits    Skin: Skin is warm, dry and intact. Capillary refill takes less than 2 seconds.   Psychiatric: His  behavior is normal. Judgment and thought content normal. Cognition and memory are normal.   Anxious appearing, intermittent stutter, chronic trouble with words         ED Course   Procedures  Labs Reviewed   COMPREHENSIVE METABOLIC PANEL - Abnormal       Result Value    Sodium 139      Potassium 4.0      Chloride 107      CO2 26      Glucose 110 (*)     Blood Urea Nitrogen 11.3      Creatinine 1.08      Calcium 9.2      Protein Total 6.4      Albumin 3.8      Globulin 2.6      Albumin/Globulin Ratio 1.5      Bilirubin Total 0.5      ALP 81      ALT 44      AST 20      eGFR >60      Anion Gap 6.0      BUN/Creatinine Ratio 10     MAGNESIUM - Normal    Magnesium Level 2.10     TROPONIN I - Normal    Troponin-I 0.027     CBC W/ AUTO DIFFERENTIAL    Narrative:     The following orders were created for panel order CBC auto differential.  Procedure                               Abnormality         Status                     ---------                               -----------         ------                     CBC with Differential[[id number]]                           Final result                 Please view results for these tests on the individual orders.   CBC WITH DIFFERENTIAL    WBC 6.36      RBC 5.15      Hgb 15.9      Hct 46.9      MCV 91.1      MCH 30.9      MCHC 33.9      RDW 13.0      Platelet 272      MPV 8.9      Neut % 59.4      Lymph % 23.6      Mono % 10.5      Eos % 5.2      Basophil % 0.8      Imm Grans % 0.5      Neut # 3.78      Lymph # 1.50      Mono # 0.67      Eos # 0.33      Baso # 0.05      Imm Gran # 0.03      NRBC% 0.0       EKG Readings: (Independently Interpreted)   Normal sinus rhythm, rate of 74 beats per minute.  No obvious ischemic changes or arrhythmia.       Imaging Results              CT Head Without Contrast (Final result)  Result time 02/18/25 06:24:48      Final result by Lisbet Poole MD (02/18/25 06:24:48)                   Impression:      Subacute ischemic changes in the  right occipital lobe.      Electronically signed by: Lisbet Poole  Date:    02/18/2025  Time:    06:24               Narrative:    EXAMINATION:  CT HEAD WITHOUT CONTRAST    CLINICAL HISTORY:  Headache, chronic, new features or increased frequency;CVA 2 weeks ago;    TECHNIQUE:  Axial scans were obtained from skull base to the vertex.    Coronal and sagittal reconstructions obtained from the axial data.    Automatic exposure control was utilized to limit radiation dose.    Contrast: None    Radiation Dose:    Total DLP: 940 mGy*cm    COMPARISON:  None    FINDINGS:  There is cortical based hypodensity in the right occipital lobe.  There is no definite evidence of hemorrhage.  There is no significant mass effect or midline shift.  The basal cisterns are patent.  The ventricles are normal in size.  There is no abnormal extra-axial fluid collection.  The calvarium and skull base are intact.  There is moderate paranasal sinus mucosal thickening                                       Medications   ALPRAZolam tablet 0.5 mg (0.5 mg Oral Given 2/18/25 0629)     Medical Decision Making  59-year-old male presenting with headache and anxiety.  Vital signs stable, afebrile.    Differential diagnosis includes but is not limited to anxiety, headache, CVA, TIA, and ICH.    Problems Addressed:  Acute nonintractable headache, unspecified headache type: acute illness or injury  Anxiety: chronic illness or injury    Amount and/or Complexity of Data Reviewed  Independent Historian: sumanth  External Data Reviewed: labs, radiology, ECG and notes.  Labs: ordered. Decision-making details documented in ED Course.  Radiology: ordered and independent interpretation performed. Decision-making details documented in ED Course.  ECG/medicine tests: ordered and independent interpretation performed.     Details: Normal sinus rhythm, rate of 74 beats per minute.  No obvious ischemic changes or arrhythmia.      Risk  Prescription drug  management.            Scribe Attestation:   Scribe #1: I performed the above scribed service and the documentation accurately describes the services I performed. I attest to the accuracy of the note.    Attending Attestation:           Physician Attestation for Scribe:  Physician Attestation Statement for Scribe #1: I, Zay Peterson, , reviewed documentation, as scribed by Aidan Tabor in my presence, and it is both accurate and complete.             ED Course as of 02/18/25 0800   Tue Feb 18, 2025   0610 59 year old male presenting with headache and anxiety.  Recently had a CVA 2 weeks ago at an outside hospital was given tPA.  Felt a popping sensation in his head, feeling anxious and presented to be sure he is not having a stroke.  The pain was in the back of his head was mostly resolved after Tylenol in his anxiety significantly decreased.  Exam is nonfocal.  We will obtain labs and a CT head to rule out intracranial hemorrhage.  Likely stable for discharge if negative workup.  We will give a dose of Xanax after CT. [MM]   0649 CT shows subacute of sacral changes in the right occipital lobe which is consistent with his prior stroke at an outside hospital less than 2 weeks ago.  Patient is without any new focal deficits, no current headache after given Tylenol at home and recently was at an outside hospital and was optimized for stroke with statin high dose as well as Eliquis.  [MM]   0731 WBC: 6.36 [MM]   0731 Hemoglobin: 15.9 [MM]   0731 Platelet Count: 272 [MM]   0735 Magnesium : 2.10 [MM]   0735 Troponin I: 0.027 [MM]   0735 Sodium: 139 [MM]   0735 Potassium: 4.0 [MM]   0735 CO2: 26 [MM]   0735 Glucose(!): 110 [MM]   0735 Creatinine: 1.08 [MM]   0735 Calcium: 9.2 [MM]   0747 Patient is resting comfortably on re-evaluation, feeling improved after the Xanax.  Does not feel any of the sensations he was feeling when he woke up.  No pain, ambulatory without ataxia, dizziness or lightheadedness.  Labs are  largely unremarkable.  After shared decision-making, we believe patient is stable for discharge home we will follow up with Neurology.  He states he was not given a Neurology after discharge, therefore I will give 1.  Stressed the importance of close follow-up and strict return precautions given.  I answered all questions to the best of my ability.  Patient and wife verbalized an understanding of the plan and agreed. [MM]      ED Course User Index  [MM] Zay Peterson DO                           Clinical Impression:  Final diagnoses:  [F41.9] Anxiety  [R51.9] Acute nonintractable headache, unspecified headache type (Primary)          ED Disposition Condition    Discharge Stable          ED Prescriptions    None       Follow-up Information       Follow up With Specialties Details Why Contact Info    Valerie Haskins NP Family Medicine Schedule an appointment as soon as possible for a visit  For emergency department follow up [address] [address]  [phone]      Mera Dave MD Neurology Schedule an appointment as soon as possible for a visit  For stroke follow up [address]  [address]  [facility] [address]  [phone]                 [1]   Social History  Tobacco Use    Smoking status: Every Day     Current packs/day: 2.00     Average packs/day: 2.0 packs/day for 30.0 years (60.0 ttl pk-yrs)     Types: Cigarettes    Smokeless tobacco: Never    Tobacco comments:     Has an appt with smoke cessation   Substance Use Topics    Alcohol use: Not Currently    Drug use: Never        Zay Peterson DO  02/18/25 0800

## 2025-03-03 DIAGNOSIS — I63.9 CVA (CEREBRAL VASCULAR ACCIDENT): Primary | ICD-10-CM

## 2025-03-09 ENCOUNTER — HOSPITAL ENCOUNTER (EMERGENCY)
Facility: HOSPITAL | Age: 60
Discharge: HOME | End: 2025-03-09
Attending: EMERGENCY MEDICINE
Payer: MEDICAID

## 2025-03-09 VITALS
SYSTOLIC BLOOD PRESSURE: 119 MMHG | DIASTOLIC BLOOD PRESSURE: 80 MMHG | HEART RATE: 64 BPM | OXYGEN SATURATION: 95 % | WEIGHT: 220 LBS | BODY MASS INDEX: 34.53 KG/M2 | HEIGHT: 67 IN | RESPIRATION RATE: 18 BRPM | TEMPERATURE: 98 F

## 2025-03-09 DIAGNOSIS — F41.9 ANXIETY: ICD-10-CM

## 2025-03-09 DIAGNOSIS — R42 DIZZINESS: ICD-10-CM

## 2025-03-09 DIAGNOSIS — Z86.73 HISTORY OF CVA (CEREBROVASCULAR ACCIDENT): ICD-10-CM

## 2025-03-09 DIAGNOSIS — R51.9 NONINTRACTABLE HEADACHE, UNSPECIFIED CHRONICITY PATTERN, UNSPECIFIED HEADACHE TYPE: Primary | ICD-10-CM

## 2025-03-09 DIAGNOSIS — C34.90 MALIGNANT NEOPLASM OF LUNG, UNSPECIFIED LATERALITY, UNSPECIFIED PART OF LUNG: ICD-10-CM

## 2025-03-09 DIAGNOSIS — R29.818 ACUTE FOCAL NEUROLOGICAL DEFICIT: ICD-10-CM

## 2025-03-09 LAB
ALBUMIN SERPL-MCNC: 3.6 G/DL (ref 3.5–5)
ALBUMIN/GLOB SERPL: 1.2 RATIO (ref 1.1–2)
ALP SERPL-CCNC: 76 UNIT/L (ref 40–150)
ALT SERPL-CCNC: 33 UNIT/L (ref 0–55)
ANION GAP SERPL CALC-SCNC: 16 MMOL/L (ref 8–16)
ANION GAP SERPL CALC-SCNC: 8 MEQ/L
AST SERPL-CCNC: 21 UNIT/L (ref 5–34)
BASOPHILS # BLD AUTO: 0.04 X10(3)/MCL
BASOPHILS NFR BLD AUTO: 0.6 %
BILIRUB SERPL-MCNC: 0.4 MG/DL
BUN SERPL-MCNC: 10 MG/DL (ref 6–30)
BUN SERPL-MCNC: 9.3 MG/DL (ref 8.4–25.7)
CALCIUM SERPL-MCNC: 9.2 MG/DL (ref 8.4–10.2)
CHLORIDE SERPL-SCNC: 103 MMOL/L (ref 95–110)
CHLORIDE SERPL-SCNC: 106 MMOL/L (ref 98–107)
CHOLEST SERPL-MCNC: 131 MG/DL
CHOLEST/HDLC SERPL: 5 {RATIO} (ref 0–5)
CO2 SERPL-SCNC: 25 MMOL/L (ref 22–29)
CREAT SERPL-MCNC: 1.07 MG/DL (ref 0.72–1.25)
CREAT SERPL-MCNC: 1.2 MG/DL (ref 0.5–1.4)
CREAT/UREA NIT SERPL: 9
EOSINOPHIL # BLD AUTO: 0.49 X10(3)/MCL (ref 0–0.9)
EOSINOPHIL NFR BLD AUTO: 7.6 %
ERYTHROCYTE [DISTWIDTH] IN BLOOD BY AUTOMATED COUNT: 13 % (ref 11.5–17)
GFR SERPLBLD CREATININE-BSD FMLA CKD-EPI: >60 ML/MIN/1.73/M2
GLOBULIN SER-MCNC: 2.9 GM/DL (ref 2.4–3.5)
GLUCOSE SERPL-MCNC: 152 MG/DL (ref 74–100)
GLUCOSE SERPL-MCNC: 155 MG/DL (ref 70–110)
HCT VFR BLD AUTO: 46.4 % (ref 42–52)
HCT VFR BLD CALC: 46 %PCV (ref 36–54)
HDLC SERPL-MCNC: 25 MG/DL (ref 35–60)
HGB BLD-MCNC: 15.6 G/DL (ref 14–18)
HGB BLD-MCNC: 16 G/DL
IMM GRANULOCYTES # BLD AUTO: 0.05 X10(3)/MCL (ref 0–0.04)
IMM GRANULOCYTES NFR BLD AUTO: 0.8 %
INR PPP: 1
LYMPHOCYTES # BLD AUTO: 1.49 X10(3)/MCL (ref 0.6–4.6)
LYMPHOCYTES NFR BLD AUTO: 23 %
MCH RBC QN AUTO: 31.4 PG (ref 27–31)
MCHC RBC AUTO-ENTMCNC: 33.6 G/DL (ref 33–36)
MCV RBC AUTO: 93.4 FL (ref 80–94)
MONOCYTES # BLD AUTO: 0.54 X10(3)/MCL (ref 0.1–1.3)
MONOCYTES NFR BLD AUTO: 8.3 %
NEUTROPHILS # BLD AUTO: 3.88 X10(3)/MCL (ref 2.1–9.2)
NEUTROPHILS NFR BLD AUTO: 59.7 %
NRBC BLD AUTO-RTO: 0 %
PLATELET # BLD AUTO: 276 X10(3)/MCL (ref 130–400)
PMV BLD AUTO: 9.1 FL (ref 7.4–10.4)
POC IONIZED CALCIUM: 1.19 MMOL/L (ref 1.06–1.42)
POC TCO2 (MEASURED): 26 MMOL/L (ref 23–29)
POCT GLUCOSE: 164 MG/DL (ref 70–110)
POTASSIUM BLD-SCNC: 3.9 MMOL/L (ref 3.5–5.1)
POTASSIUM SERPL-SCNC: 4.1 MMOL/L (ref 3.5–5.1)
PROT SERPL-MCNC: 6.5 GM/DL (ref 6.4–8.3)
PROTHROMBIN TIME: 13.6 SECONDS (ref 12.5–14.5)
RBC # BLD AUTO: 4.97 X10(6)/MCL (ref 4.7–6.1)
SAMPLE: ABNORMAL
SODIUM BLD-SCNC: 140 MMOL/L (ref 136–145)
SODIUM SERPL-SCNC: 139 MMOL/L (ref 136–145)
TRIGL SERPL-MCNC: 546 MG/DL (ref 34–140)
TSH SERPL-ACNC: 1.79 UIU/ML (ref 0.35–4.94)
WBC # BLD AUTO: 6.49 X10(3)/MCL (ref 4.5–11.5)

## 2025-03-09 PROCEDURE — 99284 EMERGENCY DEPT VISIT MOD MDM: CPT | Mod: ,,, | Performed by: SPECIALIST

## 2025-03-09 PROCEDURE — 25500020 PHARM REV CODE 255: Performed by: EMERGENCY MEDICINE

## 2025-03-09 PROCEDURE — 63600175 PHARM REV CODE 636 W HCPCS: Mod: JZ,TB | Performed by: EMERGENCY MEDICINE

## 2025-03-09 PROCEDURE — 85610 PROTHROMBIN TIME: CPT | Performed by: EMERGENCY MEDICINE

## 2025-03-09 PROCEDURE — 85025 COMPLETE CBC W/AUTO DIFF WBC: CPT | Performed by: EMERGENCY MEDICINE

## 2025-03-09 PROCEDURE — 99285 EMERGENCY DEPT VISIT HI MDM: CPT | Mod: 25

## 2025-03-09 PROCEDURE — 84443 ASSAY THYROID STIM HORMONE: CPT | Performed by: EMERGENCY MEDICINE

## 2025-03-09 PROCEDURE — 80047 BASIC METABLC PNL IONIZED CA: CPT

## 2025-03-09 PROCEDURE — 96375 TX/PRO/DX INJ NEW DRUG ADDON: CPT

## 2025-03-09 PROCEDURE — 80053 COMPREHEN METABOLIC PANEL: CPT | Performed by: EMERGENCY MEDICINE

## 2025-03-09 PROCEDURE — 25000003 PHARM REV CODE 250: Performed by: EMERGENCY MEDICINE

## 2025-03-09 PROCEDURE — 96374 THER/PROPH/DIAG INJ IV PUSH: CPT

## 2025-03-09 PROCEDURE — 80061 LIPID PANEL: CPT | Performed by: EMERGENCY MEDICINE

## 2025-03-09 PROCEDURE — 82962 GLUCOSE BLOOD TEST: CPT

## 2025-03-09 RX ORDER — PROCHLORPERAZINE EDISYLATE 5 MG/ML
10 INJECTION INTRAMUSCULAR; INTRAVENOUS
Status: COMPLETED | OUTPATIENT
Start: 2025-03-09 | End: 2025-03-09

## 2025-03-09 RX ORDER — DIPHENHYDRAMINE HYDROCHLORIDE 50 MG/ML
25 INJECTION, SOLUTION INTRAMUSCULAR; INTRAVENOUS
Status: COMPLETED | OUTPATIENT
Start: 2025-03-09 | End: 2025-03-09

## 2025-03-09 RX ORDER — KETOROLAC TROMETHAMINE 30 MG/ML
30 INJECTION, SOLUTION INTRAMUSCULAR; INTRAVENOUS
Status: COMPLETED | OUTPATIENT
Start: 2025-03-09 | End: 2025-03-09

## 2025-03-09 RX ORDER — LORAZEPAM 1 MG/1
2 TABLET ORAL
Status: COMPLETED | OUTPATIENT
Start: 2025-03-09 | End: 2025-03-09

## 2025-03-09 RX ADMIN — PROCHLORPERAZINE EDISYLATE 10 MG: 5 INJECTION INTRAMUSCULAR; INTRAVENOUS at 04:03

## 2025-03-09 RX ADMIN — IOHEXOL 100 ML: 350 INJECTION, SOLUTION INTRAVENOUS at 03:03

## 2025-03-09 RX ADMIN — KETOROLAC TROMETHAMINE 30 MG: 30 INJECTION, SOLUTION INTRAMUSCULAR; INTRAVENOUS at 04:03

## 2025-03-09 RX ADMIN — LORAZEPAM 2 MG: 1 TABLET ORAL at 05:03

## 2025-03-09 RX ADMIN — DIPHENHYDRAMINE HYDROCHLORIDE 25 MG: 50 INJECTION INTRAMUSCULAR; INTRAVENOUS at 04:03

## 2025-03-09 NOTE — CONSULTS
[facility] - Emergency Dept  Neurology  Consult Note      Paco Culp is a 59 y.o. male who presented to [facility] on 3/9/2025 with reports of  headache 10/10 awoke from nap with headache today at 130 pm. Presently headache is 2/10 in ED, no medication taken. He has been having headaches most days since Feb 2025 stroke. He has been taking prescribed for headache Norco 5mg with relief. He will also take tylenol for milder headaches .  Stroke risk factors include hyperlipidemia, hypertension, and strke . Prior stroke history: yes, right PCA territory.       Past Medical History:   Diagnosis Date    Coronary artery disease     Hyperlipidemia     Hypertension     STEMI (ST elevation myocardial infarction) 2019     Past Surgical History:   Procedure Laterality Date    ANGIOGRAM, CORONARY, WITH LEFT HEART CATHETERIZATION Left 7/17/2023    Procedure: Angiogram, Coronary, with Left Heart Cath;  Surgeon: Jim Herzog MD;  Location: [facility] CATH LAB;  Service: Cardiology;  Laterality: Left;    COLONOSCOPY, WITH DIRECTED SUBMUCOSAL INJECTION  2/2/2023    Procedure: COLONOSCOPY, WITH DIRECTED SUBMUCOSAL INJECTION;  Surgeon: Grayson Demarco MD;  Location: [facility] ENDOSCOPY;  Service: Endoscopy;;  Tattoo    COLONOSCOPY, WITH POLYPECTOMY USING SNARE N/A 2/2/2023    Procedure: COLONOSCOPY, WITH POLYPECTOMY USING SNARE;  Surgeon: Grayson Demarco MD;  Location: [facility] ENDOSCOPY;  Service: Endoscopy;  Laterality: N/A;    coronary stents       EXCISIONAL HEMORRHOIDECTOMY       Family History   Problem Relation Name Age of Onset    Cancer Mother      Heart disease Mother      Hyperlipidemia Mother      Hypertension Mother      Cancer Father       Social History[1]   Review of patient's allergies indicates:  No Known Allergies      STROKE DOCUMENTATION   Acute Stroke Times   Last Known Normal Date: 03/09/25  Last Known Normal Time: 1330  Symptom Onset Date: 03/09/25  Symptom Onset Time: 1330  Stroke Team Called Date:  03/09/25  Stroke Team Called Time: 1442  Stroke Team Arrival Date: 03/09/25  Stroke Team Arrival Time: 1445  Thrombolytic Therapy Recommended: No  Thrombectomy Recommended: No    NIH Scale:  1a. Level of Consciousness: 0-->Alert, keenly responsive  1b. LOC Questions: 0-->Answers both questions correctly  1c. LOC Commands: 0-->Performs both tasks correctly  2. Best Gaze: 0-->Normal  3. Visual: 0-->No visual loss  4. Facial Palsy: 0-->Normal symmetrical movements  5a. Motor Arm, Left: 0-->No drift, limb holds 90 (or 45) degrees for full 10 secs  5b. Motor Arm, Right: 0-->No drift, limb holds 90 (or 45) degrees for full 10 secs  6a. Motor Leg, Left: 0-->No drift, leg holds 30 degree position for full 5 secs  6b. Motor Leg, Right: 0-->No drift, leg holds 30 degree position for full 5 secs  7. Limb Ataxia: 0-->Absent  8. Sensory: 0-->Normal, no sensory loss  9. Best Language: 0-->No aphasia, normal  10. Dysarthria: 1-->Mild-to-moderate dysarthria, patient slurs at least some words and, at worst, can be understood with some difficulty  11. Extinction and Inattention (formerly Neglect): 0-->No abnormality  Total (NIH Stroke Scale): 1     Modified Boundary Score: 2  Homer Coma Scale:15   ABCD2 Score:    [id number] Score:   HAS -BLED Score:   ICH Score:   Hunt & Beard Classification:         Neurological Exam:   LOC: alert and follows requests  Language: No aphasia  Speech: Dysarthria  Orientation: Person, Place, Time  Memory: Recent memory intact, Remote memory intact, Age correct, Month correct  Visual Fields (CN II): Full  Pupils (CN III, IV, VI): PERRL  Facial Sensation (CN V): Symmetric, Corneal reflex intact  Facial Movement (CN VII): symmetric facial expression  Tongue (CN XII): to midline  Motor*: Arm Left:  Normal (5/5), Leg Left:   Normal (5/5), Arm Right:   Normal (5/5), Leg Right:   Normal (5/5)  Sensation: intact to light touch, temperature and vibration        Laboratory:  BMP:   Lab Results   Component Value  "Date    GLUCOSE 110 (H) 02/18/2025     02/18/2025    K 4.0 02/18/2025     02/18/2025    CO2 26 02/18/2025    BUN 11.3 02/18/2025    CREATININE 1.08 02/18/2025    CALCIUM 9.2 02/18/2025     CBC:   Lab Results   Component Value Date    WBC 6.36 02/18/2025    RBC 5.15 02/18/2025    HGB 15.9 02/18/2025    HCT 46.9 02/18/2025     02/18/2025    MCV 91.1 02/18/2025    MCH 30.9 02/18/2025    MCHC 33.9 02/18/2025     Lipid Panel:   Lab Results   Component Value Date    CHOL 158 07/11/2023    HDL 28 (L) 07/11/2023    TRIG 172 (H) 07/11/2023     Coagulation:   Lab Results   Component Value Date    INR 1.2 07/11/2023    APTT 28.9 07/11/2023     Hgb A1C: No results found for: "HGBA1C"  TSH: No results found for: "TSH"    Diagnostic Results  Brain Imaging:   -CTh: IMPRESSION  1. No acute intracranial hemorrhage.  2. No findings to suggest large vascular territory acute ischemic insult.  3. Expected appearance of evolving subacute/early chronic ischemic right PCA infarct.  4. Similar findings of pansinusitis.  Cerebrovascular Imaging:   -CTA h/n: No LVO   Cardiac Evaluation:   -EKG/Telemetry:         Discussed with neurologist on call: [phone]  Thrombolysis Candidate? No, Current use of direct thrombin inhibitors (dabigatran) or direct factor Xa inhibitors within 48 hours, Non-disabling symptoms - Low NIHSS   -Delays to Thrombolysis?  Not Applicable    Interventional Revascularization Candidate? No; Large core infarct on imaging   -Delays to Thrombectomy? Not Applicable  Discussed with Interventional Neurology Dr Peres at 300pm , recommends no intervention, low NIH     Assessment:   - Headache  - stable chronic ischemia right PCA infract    PLAN:  - continued care with ED provider  - keep out patient follow up as scheduled with stroke vascular neurologist, Dr. Peres on 3/25.   - Stroke neurology signing off   - Other recommendations may follow from MD    Thank you for your consult.        Jacy Read, " NP  Neurology  [facility]           [1]   Social History  Tobacco Use    Smoking status: Every Day     Current packs/day: 2.00     Average packs/day: 2.0 packs/day for 30.0 years (60.0 ttl pk-yrs)     Types: Cigarettes    Smokeless tobacco: Never    Tobacco comments:     Has an appt with smoke cessation   Substance Use Topics    Alcohol use: Not Currently    Drug use: Never

## 2025-03-09 NOTE — ED PROVIDER NOTES
"Encounter Date: 3/9/2025    SCRIBE #1 NOTE: I, Marichuy Ramirez, am scribing for, and in the presence of,  Sue Soto MD. I have scribed the following portions of the note - Other sections scribed: HPI, ROS, PE.       History     Chief Complaint   Patient presents with    Cerebrovascular Accident     Pt co dizziness, feeling like he is underwater. Severe headace     59 year old male with a past medical history of STEMI, CVA, on eliquis, CAD, non-small cell carcinoma of left lung, HLD, and HTN presenting to the ED activated as a code FAST for evaluation of stroke-like symptoms. Patient reports a headache and dizziness. He reports a recent stroke approximately one month ago, and notes that this headache feels the same. He describes this headache as "feeling like he is underwater." He states he felt normal this morning. Patient was seen at an outside facility on 2/4/25 for evaluation of headache, confusion, and vision changes. He had a stroke work-up at that time and was given TNK and placed on eliquis. He was seen here in the ED on 2/18 for another episode of this similar headache with associated anxiety.     The history is provided by the patient. No  was used.     Review of patient's allergies indicates:  No Known Allergies  Past Medical History:   Diagnosis Date    Coronary artery disease     Hyperlipidemia     Hypertension     STEMI (ST elevation myocardial infarction) 2019     Past Surgical History:   Procedure Laterality Date    ANGIOGRAM, CORONARY, WITH LEFT HEART CATHETERIZATION Left 7/17/2023    Procedure: Angiogram, Coronary, with Left Heart Cath;  Surgeon: Jim Herzog MD;  Location: [facility] CATH LAB;  Service: Cardiology;  Laterality: Left;    COLONOSCOPY, WITH DIRECTED SUBMUCOSAL INJECTION  2/2/2023    Procedure: COLONOSCOPY, WITH DIRECTED SUBMUCOSAL INJECTION;  Surgeon: Grayson Demarco MD;  Location: [facility] ENDOSCOPY;  Service: Endoscopy;;  Tattoo    COLONOSCOPY, WITH " POLYPECTOMY USING SNARE N/A 2/2/2023    Procedure: COLONOSCOPY, WITH POLYPECTOMY USING SNARE;  Surgeon: Grayson Demarco MD;  Location: [facility] ENDOSCOPY;  Service: Endoscopy;  Laterality: N/A;    coronary stents       EXCISIONAL HEMORRHOIDECTOMY       Family History   Problem Relation Name Age of Onset    Cancer Mother      Heart disease Mother      Hyperlipidemia Mother      Hypertension Mother      Cancer Father       Social History[1]  Review of Systems   Constitutional:  Negative for chills, diaphoresis and fever.   Eyes:  Negative for visual disturbance.   Respiratory:  Negative for cough and shortness of breath.    Cardiovascular:  Negative for chest pain and palpitations.   Gastrointestinal:  Negative for abdominal pain, constipation, diarrhea, nausea and vomiting.   Genitourinary:  Negative for dysuria and hematuria.   Skin:  Negative for rash.   Neurological:  Positive for dizziness and headaches. Negative for weakness and numbness.   All other systems reviewed and are negative.      Physical Exam     Initial Vitals [03/09/25 1450]   BP Pulse Resp Temp SpO2   138/88 97 20 97.7 °F (36.5 °C) 98 %      MAP       --         Physical Exam    Nursing note and vitals reviewed.  Constitutional: No distress.   HENT:   Head: Normocephalic and atraumatic. Mouth/Throat: Oropharynx is clear and moist.   Eyes: Conjunctivae are normal.   Neck: Neck supple.   Normal range of motion.  Cardiovascular:  Normal rate and regular rhythm.           Pulmonary/Chest: Breath sounds normal. No respiratory distress. He exhibits no tenderness.   Abdominal: Abdomen is soft. He exhibits no distension. There is no abdominal tenderness.   Musculoskeletal:         General: Normal range of motion.      Cervical back: Normal range of motion and neck supple.      Lumbar back: Normal. No tenderness. Normal range of motion.     Neurological: He is alert and oriented to person, place, and time. He has normal strength. No cranial nerve deficit or  sensory deficit.   Stuttering speech  No facial asymmetry, tongue protrudes midline  5/5 strength bilateral upper and lower extremities, grossly no ataxia   Skin: Skin is warm and dry.         ED Course   Procedures  Labs Reviewed   COMPREHENSIVE METABOLIC PANEL - Abnormal       Result Value    Sodium 139      Potassium 4.1      Chloride 106      CO2 25      Glucose 152 (*)     Blood Urea Nitrogen 9.3      Creatinine 1.07      Calcium 9.2      Protein Total 6.5      Albumin 3.6      Globulin 2.9      Albumin/Globulin Ratio 1.2      Bilirubin Total 0.4      ALP 76      ALT 33      AST 21      eGFR >60      Anion Gap 8.0      BUN/Creatinine Ratio 9     LIPID PANEL - Abnormal    Cholesterol Total 131      HDL Cholesterol 25 (*)     Triglyceride 546 (*)     Cholesterol/HDL Ratio 5      Narrative:     Calculated VLDL and LDL not valid with Triglyceride >400.   CBC WITH DIFFERENTIAL - Abnormal    WBC 6.49      RBC 4.97      Hgb 15.6      Hct 46.4      MCV 93.4      MCH 31.4 (*)     MCHC 33.6      RDW 13.0      Platelet 276      MPV 9.1      Neut % 59.7      Lymph % 23.0      Mono % 8.3      Eos % 7.6      Basophil % 0.6      Imm Grans % 0.8      Neut # 3.88      Lymph # 1.49      Mono # 0.54      Eos # 0.49      Baso # 0.04      Imm Gran # 0.05 (*)     NRBC% 0.0     POCT GLUCOSE - Abnormal    POCT Glucose 164 (*)    ISTAT CHEM8 - Abnormal    POC Glucose 155 (*)     POC BUN 10      POC Creatinine 1.2      POC Sodium 140      POC Potassium 3.9      POC Chloride 103      POC TCO2 (MEASURED) 26      POC Anion Gap 16      POC Ionized Calcium 1.19      POC Hematocrit 46      POC HEMOGLOBIN 16      Sample VENOUS     PROTIME-INR - Normal    PT 13.6      INR 1.0      Narrative:     Protimes are used to monitor anticoagulant agents such as warfarin. PT INR values are based on the current patient normal mean and the DANIELE value for the specific instrument reagent used.  **Routine theraputic target values for the INR are 2.0-3.0**    TSH - Normal    TSH 1.790     CBC W/ AUTO DIFFERENTIAL    Narrative:     The following orders were created for panel order CBC W/ AUTO DIFFERENTIAL.  Procedure                               Abnormality         Status                     ---------                               -----------         ------                     CBC with Differential[[id number]]       Abnormal            Final result                 Please view results for these tests on the individual orders.   POCT GLUCOSE, HAND-HELD DEVICE          Imaging Results              CT Chest Without Contrast (Final result)  Result time 03/09/25 16:42:59      Final result by Kaden Silva MD (03/09/25 16:42:59)                   Impression:      1. Suspect left perihilar enlarged lymph node or nodule.  This is not well delineated and  from the adjacent unenhanced pulmonary vasculature.  Follow-up contrast enhanced exam is recommended for definite diagnosis.    2. Left lower lung lobe small nodule.    3. Left upper lung lobe atelectatic changes.      Electronically signed by: Kaden Silva  Date:    03/09/2025  Time:    16:42               Narrative:    EXAMINATION:  CT CHEST WITHOUT CONTRAST    CLINICAL HISTORY:  Mediastinal mass;    TECHNIQUE:  Multidetector axial images were performed from thoracic inlet to below hemidiaphragms without intravenous contrast administration. Sagittal and coronal reformations performed.    Dose length product of 564 mGycm. Automated exposure control was utilized to minimize radiation dose.    COMPARISON:  January 20, 2023    FINDINGS:  There is left perihilar suspected enlarged lymph node or nodule measuring 1.8 x 2.2 cm on image 45 series 3.  There is not well assessed or  from the adjacent unopacified pulmonary vascular system.  Follow-up contrast enhanced exam is recommended for definite diagnosis.  There is also left lower lung lobe 5 mm nodule on image 54 series 2.  There are left upper lung lobe some  atelectatic changes without convincing consolidation.  There are some background lungs emphysematous changes.  No pulmonary edema.    There is no fluid within the pleural and the pericardial spaces.  There are no enlarging mediastinal or axillary lymph nodes.  Thoracic aorta is without aneurysmal dilatation.  Coronary arteries calcified plaques.  Cardiac chamber size is within normal limits.  There is some contrast within kidneys collecting system from CT angiogram performed earlier same date.                                        CTA Head and Neck (xpd) (Final result)  Result time 03/09/25 15:24:24      Final result by Everardo Meier MD (03/09/25 15:24:24)                   Narrative:    EXAMINATION  CTA HEAD AND NECK (XPD)    CLINICAL HISTORY  Neuro deficit, acute, stroke suspected;    TECHNIQUE  Pre- and post-contrast helical-acquisition CT images were obtained, imaging timed to coincide with arterial opacification for purposes of CT angiography.  Multiplanar reconstructions, to include MIP and volume-rendered (3D) images, were accomplished by a CT technologist at a separate workstation, pushed to PACS for physician review.    Evaluation of any visualized ICA stenosis was performed using NASCET criteria.    CONTRAST  IV: Omnipaque 350, 100 mL    COMPARISON  *No prior CTA is available at the time of initial interpretation.  *Non-contrast head CT obtained earlier the same day was reviewed.    FINDINGS  Images were reviewed in soft tissue, brain, subdural, and bone windows.    Exam quality: adequate for evaluation    Non-contrast Head: No significant change from the above-referenced non-contrast head CT comparison.    Intracranial Vasculature: The intracranial ICA segments are atherosclerotic with scattered calcified plaque; no evidence of flow limiting stenosis or other focal abnormality.  Unremarkable appearance of the CoW, to include the bilateral ISRAEL A1 and PCA P1 segments.  Patency of the anterior and  bilateral posterior communicating arteries grossly maintained.  There is no evidence of focal contour irregularity, aneurysmal dilatation, or angiographic cut off involving the bilateral ISRAEL and MCA vessels.  Unremarkable course, caliber, and intraluminal contrast opacification appreciated throughout the bilateral PCA vessels, as well as the basilar artery and intracranial components of the vertebral arteries. Normal contour and intraluminal contrast opacification of the dural sinuses.    Extracranial Vasculature: The bilateral common carotid vessels are unremarkable. Carotid bifurcation is grossly normal bilaterally.  The bilateral ICA segments are of normal course, caliber, and contour.  There is notable tortuosity of the right mid ICA.  No suspicious findings of the bilateral ECA branches.  Right-dominant vertebral artery system is present, with minimal punctate calcified plaque at the bilateral distal extracranial portions but no significant stenosis or other focal abnormality.  Visualized aorta is nonaneurysmal, with mild burden of scattered calcified plaque.  Normal 3-branch arch configuration is present.    Nonvascular: No definite enhancing brain lesion is identified.  The nonvascular neck structures are without evidence of acute or focal abnormality.  There are degenerative changes through the visualized spinal column.  Widespread emphysematous alterations are noted at the included upper lung zones.  There is also a partially visualized left hilar mass measuring 2.5 cm x 3.8 cm with a lateral adjacent subcentimeter nodule that may represent satellite lesion.  No pathologically enlarged lymph nodes are appreciated within the visualized chest or through the cervical regions.  There is a smoothly marginated cutaneous cystic structure left of midline at the posterior neck, without associated enhancement or surrounding inflammatory changes.    IMPRESSION  1. No significant short-interval change of the  non-contrast head CT.  2. No intracranial large vessel occlusion or other acute vascular abnormality.  3. No flow limiting stenosis or other significant abnormality involving the carotid and vertebral arteries.  4. Incidental partially visualized left hilar lung mass with possible adjacent satellite lesion.  No other suspicious focal lesions or evidence of pathologic adenopathy through the scanned region.  ==========  The above incidental lung findings were reported to Dr. Soto (Emergency Dept) prior to completion of the final report (9 March 2025, 15:18).==========  This report was flagged in Epic as abnormal.      RADIATION DOSE  Automated tube current modulation, weight-based exposure dosing, and/or iterative reconstruction technique utilized to reach lowest reasonably achievable exposure rate.    DLP: 1842 mGy*cm      Electronically signed by: Everardo Meier  Date:    03/09/2025  Time:    15:24                                     CT HEAD FOR STROKE (Final result)  Result time 03/09/25 15:09:37      Final result by Everardo Meier MD (03/09/25 15:09:37)                   Narrative:    EXAMINATION  CT HEAD FOR STROKE    CLINICAL HISTORY  Neuro deficit, acute, stroke suspected;  recent right PCA infarct, now with onset of headache and dizziness    TECHNIQUE  Non-contrast axial CT images were acquired and multiplanar reconstructions accomplished by a CT technologist at a separate workstation, pushed to PACS for physician review.    COMPARISON  18 February 2025    FINDINGS  Images were reviewed in subdural, brain, soft tissue, and bone windows.    Exam quality: Motion/streak artifact limits assessment of the posterior fossa.    Hemorrhage: No evidence of acute hyperattenuating blood products.    Parenchyma: There is similar diffuse supratentorial white matter hypoattenuation, typical of chronic microvascular changes. No discrete mass, mass effect, or CT evidence of acute large vascular territory insult. Typical  "changes of evolving subacute/early chronic ischemic infarct noted at the right PCA distribution.  Remainder of the brain demonstrates normal gray-white differentiation.  No midline shift is evident.    CSF spaces: Proportional appearance of ventricular and sulcal enlargement. No hydrocephalus. No masses or fluid collections.    Other findings: No focally hyperdense artery. Scattered carotid siphon calcifications are present. No abnormal densities within the dural sinuses. No abnormalities of the scalp or subjacent osseous structures. Mastoids are well aerated. No focal abnormality of the sella. Similar findings of pansinusitis with diffuse mucoperiosteal thickening.  Left maxillary cavity retention cyst versus mucosal polyp also unchanged.    IMPRESSION  1. No acute intracranial hemorrhage.  2. No findings to suggest large vascular territory acute ischemic insult.  3. Expected appearance of evolving subacute/early chronic ischemic right PCA infarct.  4. Similar findings of pansinusitis.  ==========    In accordance with the "Code FAST" protocol, above findings were reported to Dr. Soto and Dr. Howard (Emergency Dept) prior to completion of the final report (3/9/2025; 15:05).    RECOMMENDATION(S)  Additional assessment utilizing brain MRI would allow more sensitive evaluation if symptoms persist or there is other cause for elevated clinical concern.    RADIATION DOSE  Automated tube current modulation, weight-based exposure dosing, and/or iterative reconstruction technique utilized to reach lowest reasonably achievable exposure rate.    DLP: 1043 mGy*cm      Electronically signed by: Everardo Meier  Date:    03/09/2025  Time:    15:09                                     Medications   iohexoL (OMNIPAQUE 350) injection 100 mL (100 mLs Intravenous Given 3/9/25 6394)   prochlorperazine injection Soln 10 mg (10 mg Intravenous Given 3/9/25 1635)   diphenhydrAMINE injection 25 mg (25 mg Intravenous Given 3/9/25 1635) "   ketorolac injection 30 mg (30 mg Intravenous Given 3/9/25 1635)   LORazepam tablet 2 mg (2 mg Oral Given 3/9/25 1705)     Medical Decision Making  Amount and/or Complexity of Data Reviewed  Radiology: ordered.    Risk  Prescription drug management.      ED assessment:    Mr. Culp presented for evaluation of headache, dizziness which she describes as feeling like he is underwater.  Reports speech disturbance since posterior circulation infarct 1 month ago after which he received thrombolytics.  ED visits since that time for headache.  Also in the process of outpatient treatment for small cell lung cancer, next visit pending for earlier this week.    Patient evaluated promptly by Neurology team.  Noncontrast head CT as well as CT angiogram of the head and neck today demonstrate no acute abnormalities.  Unlikely acute CVA per Neurology, they have signed off.      Incidental note of perihilar mass on the CT of the neck.  No previous imaging available in our system; however, patient relates he is currently following with [facility] and has treatment pending for this week (Tuesday appointment per family.) .  CT today demonstrates findings similar to what they are describing to me though I am not able to review previous imaging for direct comparison.  Patient advised of CT findings and instructed to keep this follow up appointment.      After headache cocktail as well as anxiolytics in the emergency department, symptoms have resolved.  No indication of an acute CVA or other emergent process at this time.  He is comfortable with discharge home so that he may keep his outpatient follow up appointments as scheduled.  He is scheduled to see Neurology at the end of this month as well for follow up.  Encouraged to continue all home medications as previously prescribed.  ED return precautions reviewed at the bedside and provided in the written discharge instructions.  All questions answered to the  best of my ability.    Differential diagnosis (including but not limited to):   Migraine headache, tension headache, vertigo, CVA, intracranial hemorrhage, TIA, anxiety, acute kidney injury, electrolyte derangements, blood pressure fluctuation      My independent radiology interpretation:   CT head without IV contrast: No acute intracranial hemorrhage or mass lesion     Amount and/or Complexity of Data Reviewed  Independent historian: spouse    Summary of history:  Spouse at bedside relates patient currently undergoing treatment for lung cancer outpatient.  Has a few immunotherapy treatments left, seeing Oncology this week.  Scheduled to see Neurology later this month.  External data reviewed: notes from previous admissions  Summary of data reviewed: .  As summarized above  Risk and benefits of testing: discussed   Labs: ordered and reviewed  Radiology: ordered and independent interpretation performed (see above or ED course)    Discussion of management or test interpretation with external provider(s): discussed with Neurology, radiology consultant   Summary of discussion:  As above    Risk  Prescription drug management   Shared decision making     Critical Care  none    I, Sue Soto MD personally performed the history, PE, MDM, and procedures as documented above and agree with the scribe's documentation.           Scribe Attestation:   Scribe #1: I performed the above scribed service and the documentation accurately describes the services I performed. I attest to the accuracy of the note.    Attending Attestation:           Physician Attestation for Scribe:  Physician Attestation Statement for Scribe #1: I, Sue Soto MD, reviewed documentation, as scribed by Marichuy Ramirez in my presence, and it is both accurate and complete.                                    Clinical Impression:  Final diagnoses:  [R29.818] Acute focal neurological deficit  [R51.9] Nonintractable headache, unspecified chronicity  pattern, unspecified headache type (Primary)  [R42] Dizziness  [Z86.73] History of CVA (cerebrovascular accident)  [F41.9] Anxiety  [C34.90] Malignant neoplasm of lung, unspecified laterality, unspecified part of lung          ED Disposition Condition    Discharge Stable          ED Prescriptions    None       Follow-up Information       Follow up With Specialties Details Why Contact Info    Valerie Haskins NP Family Medicine Schedule an appointment as soon as possible for a visit   [address] [facility] [address]  [phone]      Rajendra Peres MD Neurology, Interventional Radiology On 3/25/2025 Keep scheduled appointment [address] [place] [address] [address]  [facility] [address]  [phone]      [facility]  Emergency Dept Emergency Medicine  As needed, If symptoms worsen [address] [place] [id number] [phone]    Oncology  On 3/11/2025 Keep scheduled appointment                  [1]   Social History  Tobacco Use    Smoking status: Every Day     Current packs/day: 2.00     Average packs/day: 2.0 packs/day for 30.0 years (60.0 ttl pk-yrs)     Types: Cigarettes    Smokeless tobacco: Never    Tobacco comments:     Has an appt with smoke cessation   Substance Use Topics    Alcohol use: Not Currently    Drug use: Never        Sue Soto MD  03/09/25 1945

## 2025-04-08 ENCOUNTER — OFFICE VISIT (OUTPATIENT)
Dept: NEUROLOGY | Facility: CLINIC | Age: 60
End: 2025-04-08
Payer: MEDICAID

## 2025-04-08 VITALS
HEART RATE: 73 BPM | SYSTOLIC BLOOD PRESSURE: 117 MMHG | WEIGHT: 220 LBS | HEIGHT: 67 IN | DIASTOLIC BLOOD PRESSURE: 87 MMHG | BODY MASS INDEX: 34.53 KG/M2

## 2025-04-08 DIAGNOSIS — I10 PRIMARY HYPERTENSION: ICD-10-CM

## 2025-04-08 DIAGNOSIS — Z95.5 H/O HEART ARTERY STENT: ICD-10-CM

## 2025-04-08 DIAGNOSIS — Z87.891 FORMER SMOKER: ICD-10-CM

## 2025-04-08 DIAGNOSIS — I69.30 HISTORY OF CVA WITH RESIDUAL DEFICIT: ICD-10-CM

## 2025-04-08 DIAGNOSIS — I51.3 MURAL THROMBUS OF CARDIAC APEX: ICD-10-CM

## 2025-04-08 DIAGNOSIS — E66.811 CLASS 1 DRUG-INDUCED OBESITY WITH BODY MASS INDEX (BMI) OF 34.0 TO 34.9 IN ADULT, UNSPECIFIED WHETHER SERIOUS COMORBIDITY PRESENT: ICD-10-CM

## 2025-04-08 DIAGNOSIS — Z86.73 CHRONIC ISCHEMIC RIGHT PCA STROKE: Primary | ICD-10-CM

## 2025-04-08 DIAGNOSIS — E66.1 CLASS 1 DRUG-INDUCED OBESITY WITH BODY MASS INDEX (BMI) OF 34.0 TO 34.9 IN ADULT, UNSPECIFIED WHETHER SERIOUS COMORBIDITY PRESENT: ICD-10-CM

## 2025-04-08 DIAGNOSIS — E78.5 HYPERLIPIDEMIA, UNSPECIFIED HYPERLIPIDEMIA TYPE: ICD-10-CM

## 2025-04-08 PROBLEM — C34.90 LUNG CANCER: Status: ACTIVE | Noted: 2025-04-08

## 2025-04-08 PROBLEM — R51.9 HEADACHE: Status: ACTIVE | Noted: 2025-04-08

## 2025-04-08 PROCEDURE — 99214 OFFICE O/P EST MOD 30 MIN: CPT | Mod: PBBFAC | Performed by: PSYCHIATRY & NEUROLOGY

## 2025-04-08 PROCEDURE — 99999 PR PBB SHADOW E&M-EST. PATIENT-LVL IV: CPT | Mod: PBBFAC,,, | Performed by: PSYCHIATRY & NEUROLOGY

## 2025-04-08 RX ORDER — HYDROCODONE BITARTRATE AND ACETAMINOPHEN 5; 325 MG/1; MG/1
TABLET ORAL
COMMUNITY
Start: 2025-02-19

## 2025-04-08 RX ORDER — ERGOCALCIFEROL 1.25 MG/1
CAPSULE ORAL
COMMUNITY
Start: 2025-04-03

## 2025-04-08 RX ORDER — EZETIMIBE 10 MG/1
TABLET ORAL
COMMUNITY
Start: 2025-03-07

## 2025-04-08 RX ORDER — CLOPIDOGREL BISULFATE 75 MG/1
1 TABLET, FILM COATED ORAL
COMMUNITY
Start: 2024-05-27

## 2025-04-08 RX ORDER — ESCITALOPRAM OXALATE 10 MG/1
TABLET ORAL
COMMUNITY
Start: 2025-03-31

## 2025-04-08 RX ORDER — ALPRAZOLAM 1 MG/1
TABLET ORAL
COMMUNITY
Start: 2024-06-25

## 2025-04-08 RX ORDER — APIXABAN 5 MG/1
5 TABLET, FILM COATED ORAL 2 TIMES DAILY
COMMUNITY

## 2025-04-09 ENCOUNTER — TELEPHONE (OUTPATIENT)
Dept: NEUROLOGY | Facility: CLINIC | Age: 60
End: 2025-04-09
Payer: MEDICAID

## 2025-04-09 RX ORDER — NORTRIPTYLINE HYDROCHLORIDE 10 MG/1
10 CAPSULE ORAL NIGHTLY
Qty: 30 CAPSULE | Refills: 11 | Status: SHIPPED | OUTPATIENT
Start: 2025-04-09 | End: 2026-04-09

## 2025-05-08 ENCOUNTER — OFFICE VISIT (OUTPATIENT)
Facility: CLINIC | Age: 60
End: 2025-05-08
Payer: MEDICAID

## 2025-05-08 VITALS
WEIGHT: 220 LBS | SYSTOLIC BLOOD PRESSURE: 110 MMHG | HEIGHT: 67 IN | BODY MASS INDEX: 34.53 KG/M2 | DIASTOLIC BLOOD PRESSURE: 82 MMHG

## 2025-05-08 DIAGNOSIS — R51.9 CHRONIC DAILY HEADACHE: Primary | ICD-10-CM

## 2025-05-08 DIAGNOSIS — G47.19 EXCESSIVE DAYTIME SLEEPINESS: ICD-10-CM

## 2025-05-08 DIAGNOSIS — G47.33 OBSTRUCTIVE SLEEP APNEA: ICD-10-CM

## 2025-05-08 DIAGNOSIS — Z86.73 CHRONIC ISCHEMIC RIGHT PCA STROKE: ICD-10-CM

## 2025-05-08 PROBLEM — Z72.0 TOBACCO USER: Status: RESOLVED | Noted: 2022-08-02 | Resolved: 2025-05-08

## 2025-05-08 PROCEDURE — 99213 OFFICE O/P EST LOW 20 MIN: CPT | Mod: PBBFAC | Performed by: NURSE PRACTITIONER

## 2025-05-08 PROCEDURE — 99999 PR PBB SHADOW E&M-EST. PATIENT-LVL III: CPT | Mod: PBBFAC,,, | Performed by: NURSE PRACTITIONER

## 2025-05-08 RX ORDER — NORTRIPTYLINE HYDROCHLORIDE 10 MG/1
20 CAPSULE ORAL NIGHTLY
Qty: 60 CAPSULE | Refills: 5 | Status: SHIPPED | OUTPATIENT
Start: 2025-05-08

## 2025-05-08 NOTE — PROGRESS NOTES
Neurology  Established Patient   SUBJECTIVE:    Patient ID: Paco Culp , 59 y.o.    Past Medical History:   Diagnosis Date    Coronary artery disease     Hyperlipidemia     Hypertension     STEMI (ST elevation myocardial infarction) 2019       Past Surgical History:   Procedure Laterality Date    ANGIOGRAM, CORONARY, WITH LEFT HEART CATHETERIZATION Left 7/17/2023    Procedure: Angiogram, Coronary, with Left Heart Cath;  Surgeon: Jim Herzog MD;  Location: [facility] CATH LAB;  Service: Cardiology;  Laterality: Left;    COLONOSCOPY, WITH DIRECTED SUBMUCOSAL INJECTION  2/2/2023    Procedure: COLONOSCOPY, WITH DIRECTED SUBMUCOSAL INJECTION;  Surgeon: Grayson Demarco MD;  Location: [facility] ENDOSCOPY;  Service: Endoscopy;;  Tattoo    COLONOSCOPY, WITH POLYPECTOMY USING SNARE N/A 2/2/2023    Procedure: COLONOSCOPY, WITH POLYPECTOMY USING SNARE;  Surgeon: Grayson Demarco MD;  Location: [facility] ENDOSCOPY;  Service: Endoscopy;  Laterality: N/A;    coronary stents       EXCISIONAL HEMORRHOIDECTOMY         Review of patient's allergies indicates:  No Known Allergies    Chief Complaint:  Established  pt, referred for sleep and headache management    History of Present Illness:   Established  pt, referred for sleep and headache management    Diagnosed with right PCA stroke in April 2025; has residual speech changes and headaches.   He was started on norco during his hospital stay for headaches. He states that the headaches are most severe at night time but can also occur during day time.   Has HA everyday located to the back of his head, sometimes he feels it behind his eye.   Photo/phonophobia present.   Sees floaters in L eye      started him on Nortriptyline 10 mg Qhs on 4/8/25     Reports loud snoring, witnessed apnea.   Sleeps throughout the whole night   Reports EDS. Takes daytime naps.   Reports vivid dreams/hallucinations.     Review of Systems - as per HPI, otherwise a balanced 10 systems  "review is negative.      Current Medications:  Current Outpatient Medications   Medication Instructions    ALPRAZolam (XANAX) 1 mg, 3 times daily PRN    aspirin (ECOTRIN) 81 mg, Oral, Daily    atorvastatin (LIPITOR) 80 mg, Oral, Nightly    ELIQUIS 5 mg, 2 times daily    EScitalopram oxalate (LEXAPRO) 20 mg, Daily    ezetimibe (ZETIA) 10 mg tablet ezetimibe 10 mg tablet, [RxNorm: 903494]    isosorbide mononitrate (IMDUR) 30 mg, Oral, Daily    lisinopriL 10 mg, Oral, Daily    metoprolol tartrate (LOPRESSOR) 25 mg, Oral, Daily    nitroGLYCERIN (NITROSTAT) 0.4 mg, Sublingual, Every 5 min PRN    nortriptyline (PAMELOR) 10 mg, Oral, Nightly    PLAVIX 75 mg tablet 1 tablet    ranolazine (RANEXA) 500 mg, Oral, 2 times daily       OBJECTIVE:    Vitals:  /82 (BP Location: Left arm, Patient Position: Sitting)   Ht 5' 7" (1.702 m)   Wt 99.8 kg (220 lb)   BMI 34.46 kg/m²      Physical Exam:  Constitutional  he appears well-developed and well-nourished. he is well groomed.    Accompanied by - wife  Appearance - well appearing, no apparent distress, unassisted  Class 4 pharynx  Skin- no obvious lesions noted    Neurologic  Cortical function - The patient is alert, attentive, and oriented  Speech - stuttering speech  CN 8 - hearing is grossly normal  Motor - grossly normal  Gait - unassisted; posture upright. gait is steady with normal steps        5/8/2025   EPWORTH SLEEPINESS SCALE   Sitting and reading 3   Watching TV 2   Sitting, inactive in a public place (e.g. a theatre or a meeting) 0   As a passenger in a car for an hour without a break 0   Lying down to rest in the afternoon when circumstances permit 3   Sitting and talking to someone 0   Sitting quietly after a lunch without alcohol 2   In a car, while stopped for a few minutes in traffic 0   Total score 10        ASSESSMENT /PLAN:    Problem List Items Addressed This Visit          Neuro    Chronic daily headache - Primary    Increase Nortriptyline to 10 mg, 2 " nightly    Abrupt cessation of 2.5 ppd after stroke    Rebound HA  - Stop tylenol     Hx of stroke so triptans are contraindicated         Other    Obstructive sleep apnea    - Lengthy discussion about the possible diagnosis (of sleep apnea), testing, treatment and the risks of untreated sleep apnea. Potential need for ALESSIA treatment discussed including CPAP or Bilevel PAP. Alternatives to PAP discussed if PAP not ultimately tolerated. Benefits of In lab testing versus HST discussed. Pt agreed to proceed with PSG  - importance of healthy diet, regular exercise and sleep hygiene discussed      Excessive daytime sleepiness    - Risks of drowsy driving discussed       Questions and concerns were sought and answered to the patient's stated verbal satisfaction.    The patient verbalizes understanding and agreement with the above stated treatment plan.   Items discussed include acute and/or chronic neurological, sleep, or other issues and their attendant differential diagnoses.  Potential for additional testing, treatment options, and prognosis also discussed.    ___single dx __*_multiple issues/ diagnoses  ___ low __ mod __*_ high complexity of data  ___low __mod __*_ high risks     Medical Decision Making (MDM) used for CPT choice:  ___low  ___moderate  _*___high        FABRICE EsquivelC  [facility]  [phone]

## 2025-06-09 ENCOUNTER — TELEPHONE (OUTPATIENT)
Dept: SLEEP MEDICINE | Facility: HOSPITAL | Age: 60
End: 2025-06-09
Payer: MEDICAID

## 2025-06-09 DIAGNOSIS — G47.19 EXCESSIVE DAYTIME SLEEPINESS: ICD-10-CM

## 2025-06-09 DIAGNOSIS — Z86.73 CHRONIC ISCHEMIC RIGHT PCA STROKE: ICD-10-CM

## 2025-06-09 DIAGNOSIS — G47.33 OBSTRUCTIVE SLEEP APNEA: Primary | ICD-10-CM

## 2025-06-12 NOTE — TELEPHONE ENCOUNTER
PSG would be ideal given recent hx of stroke but if insurance denied okay to proceed with HST. Please let the pt know why hst will be done instead of psg

## 2025-06-25 ENCOUNTER — PROCEDURE VISIT (OUTPATIENT)
Dept: SLEEP MEDICINE | Facility: HOSPITAL | Age: 60
End: 2025-06-25
Attending: NURSE PRACTITIONER
Payer: MEDICAID

## 2025-06-25 DIAGNOSIS — G47.33 OBSTRUCTIVE SLEEP APNEA: ICD-10-CM

## 2025-06-25 DIAGNOSIS — G47.19 EXCESSIVE DAYTIME SLEEPINESS: ICD-10-CM

## 2025-06-25 DIAGNOSIS — Z86.73 CHRONIC ISCHEMIC RIGHT PCA STROKE: ICD-10-CM

## 2025-06-25 PROCEDURE — 95810 POLYSOM 6/> YRS 4/> PARAM: CPT | Mod: 26,,, | Performed by: SPECIALIST

## 2025-06-25 PROCEDURE — 95810 POLYSOM 6/> YRS 4/> PARAM: CPT

## 2025-07-07 ENCOUNTER — PATIENT MESSAGE (OUTPATIENT)
Dept: NEUROLOGY | Facility: CLINIC | Age: 60
End: 2025-07-07
Payer: MEDICAID

## 2025-07-14 ENCOUNTER — OFFICE VISIT (OUTPATIENT)
Facility: CLINIC | Age: 60
End: 2025-07-14
Payer: MEDICAID

## 2025-07-14 VITALS
SYSTOLIC BLOOD PRESSURE: 132 MMHG | HEIGHT: 67 IN | BODY MASS INDEX: 34.53 KG/M2 | WEIGHT: 220 LBS | DIASTOLIC BLOOD PRESSURE: 82 MMHG

## 2025-07-14 DIAGNOSIS — R51.9 CHRONIC DAILY HEADACHE: ICD-10-CM

## 2025-07-14 DIAGNOSIS — G47.33 OBSTRUCTIVE SLEEP APNEA: Primary | ICD-10-CM

## 2025-07-14 DIAGNOSIS — R09.02 HYPOXEMIA: ICD-10-CM

## 2025-07-14 PROCEDURE — 99999 PR PBB SHADOW E&M-EST. PATIENT-LVL III: CPT | Mod: PBBFAC,,, | Performed by: NURSE PRACTITIONER

## 2025-07-14 PROCEDURE — 3079F DIAST BP 80-89 MM HG: CPT | Mod: CPTII,,, | Performed by: NURSE PRACTITIONER

## 2025-07-14 PROCEDURE — 3008F BODY MASS INDEX DOCD: CPT | Mod: CPTII,,, | Performed by: NURSE PRACTITIONER

## 2025-07-14 PROCEDURE — 3075F SYST BP GE 130 - 139MM HG: CPT | Mod: CPTII,,, | Performed by: NURSE PRACTITIONER

## 2025-07-14 PROCEDURE — 1159F MED LIST DOCD IN RCRD: CPT | Mod: CPTII,,, | Performed by: NURSE PRACTITIONER

## 2025-07-14 PROCEDURE — 4010F ACE/ARB THERAPY RXD/TAKEN: CPT | Mod: CPTII,,, | Performed by: NURSE PRACTITIONER

## 2025-07-14 PROCEDURE — 99213 OFFICE O/P EST LOW 20 MIN: CPT | Mod: PBBFAC | Performed by: NURSE PRACTITIONER

## 2025-07-14 PROCEDURE — 99213 OFFICE O/P EST LOW 20 MIN: CPT | Mod: S$PBB,,, | Performed by: NURSE PRACTITIONER

## 2025-07-14 RX ORDER — NORTRIPTYLINE HYDROCHLORIDE 25 MG/1
25 CAPSULE ORAL NIGHTLY
Start: 2025-07-14

## 2025-07-14 NOTE — PROGRESS NOTES
Established Patient   SUBJECTIVE:    Patient ID: Paco Culp , 59 y.o.    Past Medical History:   Diagnosis Date    Coronary artery disease     Hyperlipidemia     Hypertension     STEMI (ST elevation myocardial infarction) 2019       Past Surgical History:   Procedure Laterality Date    ANGIOGRAM, CORONARY, WITH LEFT HEART CATHETERIZATION Left 7/17/2023    Procedure: Angiogram, Coronary, with Left Heart Cath;  Surgeon: Jim Herzog MD;  Location: [facility] CATH LAB;  Service: Cardiology;  Laterality: Left;    COLONOSCOPY, WITH DIRECTED SUBMUCOSAL INJECTION  2/2/2023    Procedure: COLONOSCOPY, WITH DIRECTED SUBMUCOSAL INJECTION;  Surgeon: Grayson Demarco MD;  Location: [facility] ENDOSCOPY;  Service: Endoscopy;;  Tattoo    COLONOSCOPY, WITH POLYPECTOMY USING SNARE N/A 2/2/2023    Procedure: COLONOSCOPY, WITH POLYPECTOMY USING SNARE;  Surgeon: Grayson Demarco MD;  Location: [facility] ENDOSCOPY;  Service: Endoscopy;  Laterality: N/A;    coronary stents       EXCISIONAL HEMORRHOIDECTOMY         Review of patient's allergies indicates:  No Known Allergies        History of Present Illness    CHIEF COMPLAINT:  Paco presents today for follow up of PSG results and migraine      SLEEP STUDY RESULTS:  He describes frequent tossing and turning throughout the night, a pattern he reports experiencing since birth. He expresses strong dislike for nasal cannula, stating it was aggravating and he believes he repeatedly removed it during the study. He indicates reluctance to use CPAP mask, citing difficulty tolerating devices near his face.    HEADACHES:  Headaches related to immunotherapy, describing sensation as if eyes want to pop out of head. Previously on Norco which became ineffective. Currently taking Percocet, which he reports is helping to manage headache symptoms. He notes improvement in headache intensity since starting Percocet. He was previously started on Nortriptyline without significant relief.       Here for PAP  "follow up. Sleeping well at night with PAP. Feels better the following day after PAP use; denies EDS.   Denies problems with PAP machine. Able to get supplies    Review of Systems - as per HPI, otherwise a balanced 10 systems review is negative.      Current Medications:  Current Outpatient Medications   Medication Instructions    ALPRAZolam (XANAX) 1 mg, 3 times daily PRN    atorvastatin (LIPITOR) 80 mg, Oral, Nightly    EScitalopram oxalate (LEXAPRO) 20 mg, Daily    ezetimibe (ZETIA) 10 mg tablet ezetimibe 10 mg tablet, [RxNorm: 937093]    isosorbide mononitrate (IMDUR) 30 mg, Oral, Daily    lisinopriL 10 mg, Oral, Daily    metoprolol tartrate (LOPRESSOR) 25 mg, Oral, Daily    nitroGLYCERIN (NITROSTAT) 0.4 mg, Sublingual, Every 5 min PRN    nortriptyline (PAMELOR) 25 mg, Oral, Nightly    PLAVIX 75 mg tablet 1 tablet    ranolazine (RANEXA) 500 mg, Oral, 2 times daily         OBJECTIVE:    Vitals:  /82 (BP Location: Left arm, Patient Position: Sitting)   Ht 5' 7" (1.702 m)   Wt 99.8 kg (220 lb)   BMI 34.46 kg/m²      Physical Exam:  Constitutional  he appears well-developed and well-nourished. he is well groomed.    Accompanied by - self  Appearance - well appearing  Skin- no obvious lesions noted    Neurologic  Cortical function - The patient is alert, attentive   stuttering speech   CN 8 - hearing is grossly normal  Gait - unassisted     Review of Data:      Sleep study results revealed frequent nocturnal oxygen desaturations. He slept 294 minutes out of 393 minutes recorded, with 20 oxygen drops averaging 4 episodes per hour. Minimum SpO2 was 84%, with 21 minutes spent below 88% SpO2.         5/8/2025     3:24 PM   EPWORTH SLEEPINESS SCALE   Sitting and reading 3   Watching TV 2   Sitting, inactive in a public place (e.g. a theatre or a meeting) 0   As a passenger in a car for an hour without a break 0   Lying down to rest in the afternoon when circumstances permit 3   Sitting and talking to someone 0 "   Sitting quietly after a lunch without alcohol 2   In a car, while stopped for a few minutes in traffic 0   Total score 10              ASSESSMENT /PLAN:    Assessment & Plan    R51.9 Chronic daily headache  G47.33 Obstructive sleep apnea  R09.02 Hypoxemia    R51.9 CHRONIC DAILY HEADACHE:  - Assessed effectiveness of nortriptyline for headache management.  - Continue nortriptyline  25 mg.    G47.33 OBSTRUCTIVE SLEEP APNEA:  - Reviewed sleep study results: 20 oxygen drops (4 episodes/hour), WNL, but minimum SpO2 84% with 21 minutes below 88%.  - Explained sleep study results, including graphical representation of sleep stages, oxygen levels, and body positions.  - Discussed importance of adequate oxygenation during sleep for healing and organ function, especially given heart disease and lung cancer.  - Educated on purpose of CPAP therapy for addressing sleep breathing problems.  - Will repeat PSG    R09.02 HYPOXEMIA:  - Reviewed sleep study results: 20 oxygen drops (4 episodes/hour), WNL, but minimum SpO2 84% with 21 minutes below 88%.  - Evaluated need for CPAP therapy given low oxygen levels during sleep.  - Discussed importance of adequate oxygenation during sleep for healing and organ function, especially given heart disease and lung cancer.  - Complex medical history includes recent heart attack, Stage III lung cancer, stroke, and current chemotherapy.    PLAN SUMMARY:  - Continue Percocet for immunotherapy-related headaches  - Adjust nortriptyline dosage from 25 mg (specific new dosage not provided)  - Initiate CPAP therapy for sleep breathing problems and low oxygen levels  - Continue current chemotherapy regimen for Stage III lung cancer  - Follow-up to assess effectiveness of nortriptyline for headache management  - Follow-up to evaluate CPAP therapy effectiveness            This note was generated with the assistance of ambient listening technology. Verbal consent was obtained by the patient and  accompanying visitor(s) for the recording of patient appointment to facilitate this note. I attest to having reviewed and edited the generated note for accuracy, though some syntax or spelling errors may persist. Please contact the author of this note for any clarification.    Questions and concerns were sought and answered to the patient's stated verbal satisfaction.    The patient verbalizes understanding and agreement with the above stated treatment plan.   Items discussed include acute and/or chronic neurological, sleep, or other issues and their attendant differential diagnoses.  Potential for additional testing, treatment options, and prognosis also discussed.           Jane Claudio, SOLEDAD-C  [facility]  [phone]

## (undated) DEVICE — MANIFOLD 4 PORT

## (undated) DEVICE — Device

## (undated) DEVICE — FORCEP ALLIGATOR 2.8MM W/NDL

## (undated) DEVICE — INTRODUCER TRNSRADIAL 6F 10CM

## (undated) DEVICE — NDL BLUNT FILL 18G 1IN

## (undated) DEVICE — DRSNG POLYSKIN TRNSPAR 4X4.75

## (undated) DEVICE — GUIDEWIRE EMERALD 3MM 175X5CM

## (undated) DEVICE — CATH OPTITORQUE RADIAL 5FR

## (undated) DEVICE — ELECTRODE PATIENT RETURN DISP

## (undated) DEVICE — TRAP ETRAP POLYP 50 TRAY

## (undated) DEVICE — COVER CIV-FLEX PROBE US 58IN

## (undated) DEVICE — SNARE EXACTO COLD

## (undated) DEVICE — HEMOSTAT VASC BAND X-LONG 29CM

## (undated) DEVICE — SOL IRRI STRL WATER 1000ML

## (undated) DEVICE — MARKER ENDOSCOPIC SPOT EX

## (undated) DEVICE — DRAPE RADIAL BRACHIAL 29X42IN

## (undated) DEVICE — OMNIPAQUE 350MG 150ML VIAL

## (undated) DEVICE — KIT SURGICAL COLON .25 1.1OZ